# Patient Record
Sex: MALE | Race: WHITE | NOT HISPANIC OR LATINO | Employment: OTHER | URBAN - METROPOLITAN AREA
[De-identification: names, ages, dates, MRNs, and addresses within clinical notes are randomized per-mention and may not be internally consistent; named-entity substitution may affect disease eponyms.]

---

## 2019-01-17 PROBLEM — Z72.0 TOBACCO ABUSE: Chronic | Status: ACTIVE | Noted: 2019-01-17

## 2019-01-17 PROBLEM — E78.00 HYPERCHOLESTEROLEMIA: Chronic | Status: ACTIVE | Noted: 2019-01-17

## 2019-01-17 PROBLEM — I10 ESSENTIAL HYPERTENSION: Chronic | Status: ACTIVE | Noted: 2019-01-17

## 2019-02-21 PROBLEM — D75.1 POLYCYTHEMIA: Status: ACTIVE | Noted: 2019-02-21

## 2019-02-21 NOTE — PROGRESS NOTES
[facility] Hematolgy/Oncology  History & Physical    Subjective:      Patient ID:   NAME: Grisel Mckeon : 1955     63 y.o. male    Referring Doc: Bessy/Grisel Hopper  Other Physicians: Fab        Chief Complaint: polycythemia      HPI:  63 y.o. male with diagnosis of polycythemia who has been referred by Dr Hopper for evaluation by medical hematology. 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 He is here by himself. He is retired [employer] worker. He is a chronic active smoker. He does not take testosterone. He denies any CP, SOB, HA or N/V.               ROS:   GEN: normal without any fever, night sweats or weight loss  HEENT: normal with no HA's, sore throat, stiff neck, changes in vision  CV: normal with no CP, SOB, PND, HIGHTOWER or orthopnea  PULM: normal with no SOB, cough, hemoptysis, sputum or pleuritic pain  GI: normal with no abdominal pain, nausea, vomiting, constipation, diarrhea, melanotic stools, BRBPR, or hematemesis  : normal with no hematuria, dysuria  BREAST: normal with no mass, discharge, pain  SKIN: normal with no rash, erythema, bruising, or swelling       Past Medical/Surgical History:  Past Medical History:   Diagnosis Date    Chronic kidney disease     frequent kidney stones    Hypertension     Polycythemia 2019     Past Surgical History:   Procedure Laterality Date    eslw/ right, left       LITHOTRIPSY Bilateral     one surgery on each side         Allergies:  Review of patient's allergies indicates:  No Known Allergies    Social/Family History:  Social History     Socioeconomic History    Marital status: Single     Spouse name: Not on file    Number of children: Not on file    Years of education: Not on file    Highest education level: Not on file   Social Needs    Financial resource strain: Not on file    Food insecurity - worry: Not on file    Food insecurity - inability: Not on file    Transportation needs - medical: Not on file  "   Transportation needs - non-medical: Not on file   Occupational History    Occupation: retired [employer]   Tobacco Use    Smoking status: Current Some Day Smoker     Packs/day: 1.00     Types: Cigarettes    Smokeless tobacco: Never Used   Substance and Sexual Activity    Alcohol use: Yes     Alcohol/week: 0.6 oz     Types: 1 Cans of beer per week     Comment: Occasionally    Drug use: No    Sexual activity: No     Partners: Female   Other Topics Concern    Not on file   Social History Narrative    Not on file     Family History   Problem Relation Age of Onset    Heart disease Father     Asthma Mother     Cancer Brother         lung ca         Medications:    Current Outpatient Medications:     amLODIPine (NORVASC) 10 MG tablet, TK 1 T PO QD, Disp: 90 tablet, Rfl: 0    lisinopril (PRINIVIL,ZESTRIL) 5 MG tablet, Take 1 tablet (5 mg total) by mouth once daily., Disp: 90 tablet, Rfl: 0    metoprolol succinate (TOPROL XL) 25 MG 24 hr tablet, Take 1 tablet (25 mg total) by mouth once daily., Disp: 90 tablet, Rfl: 0    simvastatin (ZOCOR) 10 MG tablet, Take 1 tablet (10 mg total) by mouth every evening., Disp: 90 tablet, Rfl: 0    cloNIDine (CATAPRES) 0.1 MG tablet, Take 1 tablet (0.1 mg total) by mouth every 6 (six) hours as needed (Blood pressure greater than 150/100.)., Disp: 30 tablet, Rfl: 0    clotrimazole (LOTRIMIN) 1 % cream, APLLY ONE APPLICATION TO THE SKIN TWICE DAILY, Disp: , Rfl: 0      Pathology:  Cancer Staging  No matching staging information was found for the patient.      Objective:   Vitals:  Blood pressure (!) 164/77, pulse 74, temperature 98.6 °F (37 °C), temperature source Oral, resp. rate 20, height 5' 6" (1.676 m), weight 63.4 kg (139 lb 11.2 oz).    Physical Examination:   GEN: no apparent distress, comfortable; AAOx3  HEAD: atraumatic and normocephalic  EYES: no pallor, no icterus, PERRLA  ENT: OMM, no pharyngeal erythema, external ears WNL; no nasal discharge; no thrush  NECK: no " masses, thyroid normal, trachea midline, no LAD/LN's, supple  CV: RRR with no murmur; normal pulse; normal S1 and S2; no pedal edema  CHEST: Normal respiratory effort; CTAB; normal breath sounds; no wheeze or crackles  ABDOM: nontender and nondistended; soft; normal bowel sounds; no rebound/guarding  MUSC/Skeletal: ROM normal; no crepitus; joints normal; no deformities or arthropathy  EXTREM: no clubbing, cyanosis, inflammation or swelling  SKIN: no rashes, lesions, ulcers, petechiae or subcutaneous nodules  : no carroll  NEURO: grossly intact; motor/sensory WNL; AAOx3; no tremors  PSYCH: normal mood, affect and behavior  LYMPH: normal cervical, supraclavicular, axillary and groin LN's      Labs:   Lab Results   Component Value Date    WBC 10.2 01/09/2019    HGB 18.2 (H) 01/09/2019    HCT 52.2 (H) 01/09/2019    MCV 92.4 01/09/2019     01/09/2019    CMP  Sodium   Date Value Ref Range Status   01/09/2019 140 135 - 146 mmol/L Final     Potassium   Date Value Ref Range Status   01/09/2019 4.4 3.5 - 5.3 mmol/L Final     Chloride   Date Value Ref Range Status   01/09/2019 103 98 - 110 mmol/L Final     CO2   Date Value Ref Range Status   01/09/2019 28 20 - 32 mmol/L Final     Glucose   Date Value Ref Range Status   01/09/2019 100 (H) 65 - 99 mg/dL Final     Comment:                   Fasting reference interval     For someone without known diabetes, a glucose value  between 100 and 125 mg/dL is consistent with  prediabetes and should be confirmed with a  follow-up test.          BUN, Bld   Date Value Ref Range Status   01/09/2019 25 7 - 25 mg/dL Final     Creatinine   Date Value Ref Range Status   01/09/2019 0.74 0.70 - 1.25 mg/dL Final     Comment:     For patients >49 years of age, the reference limit  for Creatinine is approximately 13% higher for people  identified as -American.          Calcium   Date Value Ref Range Status   01/09/2019 10.0 8.6 - 10.3 mg/dL Final     Total Protein   Date Value Ref Range  Status   01/09/2019 7.2 6.1 - 8.1 g/dL Final     Albumin   Date Value Ref Range Status   01/09/2019 4.7 3.6 - 5.1 g/dL Final     Total Bilirubin   Date Value Ref Range Status   01/09/2019 1.4 (H) 0.2 - 1.2 mg/dL Final     Alkaline Phosphatase   Date Value Ref Range Status   01/09/2019 61 40 - 115 U/L Final     AST   Date Value Ref Range Status   01/09/2019 18 10 - 35 U/L Final     ALT   Date Value Ref Range Status   01/09/2019 25 9 - 46 U/L Final     Anion Gap   Date Value Ref Range Status   04/29/2012 13 8 - 16 mmol/L Final     eGFR if    Date Value Ref Range Status   01/09/2019 115 > OR = 60 mL/min/1.73m2 Final     eGFR if non    Date Value Ref Range Status   01/09/2019 99 > OR = 60 mL/min/1.73m2 Final         Radiology/Diagnostic Studies:          All lab results and imaging results have been reviewed and discussed with the patient    Assessment:   (1) 63 y.o. male  with diagnosis of polycythemia who has been referred by Dr Hopper for evaluation by medical hematology. 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   - hgb currently 18.2  - check ABG, LDH, retic, JAK2, CXR and US of liver and spleen to rule out a primary polycythemia process      (2) HTN and hypercholesterolemia    (3) Tobacco use    (4) CRI    (5) hx/of kidney stones s/p lithotripsy - followed by Dr Sanon with  in past    VISIT DIAGNOSES:              Tobacco abuse    Polycythemia            Plan:     PLAN:  1. Will order the above labs and studies  2. encouraged tobacco cessation  3. F/u with PCP  4. Consideration for pulmonary referral after the above results  RTC in 4  weeks   Fax note to Anant Sanon Jr., MD; Ruchi        I have explained and the patient understands all of  the current recommendation(s). I have answered all of their questions to the best of my ability and to their complete satisfaction.             Thank you for allowing me to participate in this  patient's care. Please call with any questions or concerns.    Electronically signed Zaheer Landers MD

## 2019-02-22 ENCOUNTER — OFFICE VISIT (OUTPATIENT)
Dept: HEMATOLOGY/ONCOLOGY | Facility: CLINIC | Age: 64
End: 2019-02-22
Payer: COMMERCIAL

## 2019-02-22 VITALS
SYSTOLIC BLOOD PRESSURE: 164 MMHG | BODY MASS INDEX: 22.45 KG/M2 | RESPIRATION RATE: 20 BRPM | HEART RATE: 74 BPM | WEIGHT: 139.69 LBS | DIASTOLIC BLOOD PRESSURE: 77 MMHG | HEIGHT: 66 IN | TEMPERATURE: 99 F

## 2019-02-22 DIAGNOSIS — D75.1 POLYCYTHEMIA: ICD-10-CM

## 2019-02-22 DIAGNOSIS — Z72.0 TOBACCO ABUSE: Primary | Chronic | ICD-10-CM

## 2019-02-22 PROCEDURE — 3078F PR MOST RECENT DIASTOLIC BLOOD PRESSURE < 80 MM HG: ICD-10-PCS | Mod: ,,, | Performed by: INTERNAL MEDICINE

## 2019-02-22 PROCEDURE — 3077F PR MOST RECENT SYSTOLIC BLOOD PRESSURE >= 140 MM HG: ICD-10-PCS | Mod: ,,, | Performed by: INTERNAL MEDICINE

## 2019-02-22 PROCEDURE — 99203 PR OFFICE/OUTPT VISIT, NEW, LEVL III, 30-44 MIN: ICD-10-PCS | Mod: ,,, | Performed by: INTERNAL MEDICINE

## 2019-02-22 PROCEDURE — 99203 OFFICE O/P NEW LOW 30 MIN: CPT | Mod: ,,, | Performed by: INTERNAL MEDICINE

## 2019-02-22 PROCEDURE — 3008F BODY MASS INDEX DOCD: CPT | Mod: ,,, | Performed by: INTERNAL MEDICINE

## 2019-02-22 PROCEDURE — 3078F DIAST BP <80 MM HG: CPT | Mod: ,,, | Performed by: INTERNAL MEDICINE

## 2019-02-22 PROCEDURE — 3077F SYST BP >= 140 MM HG: CPT | Mod: ,,, | Performed by: INTERNAL MEDICINE

## 2019-02-22 PROCEDURE — 3008F PR BODY MASS INDEX (BMI) DOCUMENTED: ICD-10-PCS | Mod: ,,, | Performed by: INTERNAL MEDICINE

## 2019-02-22 NOTE — PATIENT INSTRUCTIONS
How to Quit Smoking  Smoking is one of the hardest habits to break. About half of all people who have ever smoked have been able to quit. Most people who still smoke want to quit. Here are some of the best ways to stop smoking.    Keep trying  Most smokers make many attempts at quitting before they are successful. Its important not to give up.  Go cold turkey  Most former smokers quit cold turkey (all at once). Trying to cut back gradually doesn't seem to work as well, perhaps because it continues the smoking habit. Also, it is possible to inhale more while smoking fewer cigarettes. This results in the same amount of nicotine in your body.  Get support  Support programs can be a big help, especially for heavy smokers. These groups offer lectures, ways to change behavior, and peer support. Here are some ways to find a support program:  · Free national quitline: 800-QUIT-NOW (460-133-5943).  · Hospital quit-smoking programs.  · American Lung Association: (349.990.1263).  · American Cancer Society (368-955-4750).  Support at home is important too. Nonsmokers can offer praise and encouragement. If the smoker in your life finds it hard to quit, encourage them to keep trying.  Over-the-counter medicines  Nicotine replacement therapy may make quitting easier. Certain aids, such as the nicotine patch, gum, and lozenges, are available without a prescription. It is best to use these under a doctors care, though. The skin patch provides a steady supply of nicotine. Nicotine gum and lozenges give temporary bursts of low levels of nicotine. Both methods reduce the craving for cigarettes. Warning: If you have nausea, vomiting, dizziness, weakness, or a fast heartbeat, stop using these products and see your doctor.  Prescription medicines  After reviewing your smoking patterns and past attempts to quit, your doctor may offer a prescription medicine such as bupropion, varenicline, a nicotine inhaler, or nasal spray. Each has  "advantages and side effects. Your doctor can review these with you.  Health benefits of quitting  The benefits of quitting start right away and keep improving the longer you go without smoking. These benefits occur at any age.  So whether you are 17 or 70, quitting is a good decision. Some of the benefits include:  · 20 minutes: Blood pressure and pulse return to normal.  · 8 hours: Oxygen levels return to normal.  · 2 days: Ability to smell and taste begin to improve as damaged nerves regrow.  · 2 to 3 weeks: Circulation and lung function improve.  · 1 to 9 months: Coughing, congestion, and shortness of breath decrease; tiredness decreases.  · 1 year: Risk of heart attack decreases by half.  · 5 years: Risk of lung cancer decreases by half; risk of stroke becomes the same as a nonsmokers.  For more on how to quit smoking, try these online resources:   · Smokefree.gov  · "Clearing the Air" booklet from the National Cancer [place]: smokefree.gov/sites/default/files/pdf/clearing-the-air-accessible.pdf  Date Last Reviewed: 3/1/2017  © 0111-3464 [employer]. [address] [place] [address]. All rights reserved. This information is not intended as a substitute for professional medical care. Always follow your healthcare professional's instructions.        "

## 2019-02-22 NOTE — LETTER
February 22, 2019      Anant Sanon Jr., MD  [address]  [facility] [address]           [facility] - Hematology Oncology  [address]  [address]  [facility] [address]  Phone: [phone]  Fax: [phone]          Patient: Grisel Mckeon   MR Number: [id number]   YOB: 1955   Date of Visit: 2/22/2019       Dear Dr. Anant Sanon Jr.:    Thank you for referring Grisel Mckeon to me for evaluation. Attached you will find relevant portions of my assessment and plan of care.    If you have questions, please do not hesitate to call me. I look forward to following Grisel Mckeon along with you.    Sincerely,    Zaheer Landers MD    Enclosure  CC:  No Recipients    If you would like to receive this communication electronically, please contact [email or web address] or [phone] to request more information on [employer] access.    For providers and/or their staff who would like to refer a patient to [facility], please contact us through our one-stop-shop provider referral line, [facility], at [phone].    If you feel you have received this communication in error or would no longer like to receive these types of communications, please e-mail [email or web address]

## 2019-03-15 ENCOUNTER — TELEPHONE (OUTPATIENT)
Dept: HEMATOLOGY/ONCOLOGY | Facility: CLINIC | Age: 64
End: 2019-03-15

## 2019-03-15 NOTE — TELEPHONE ENCOUNTER
Called to see if the pt had any labs done prior to f/u appt.    Primary #  VM not set up.    2nd # was good, the pt will go back to the lab and have the two test that wasn't done performed for his f/u.     Pt VU

## 2019-03-18 NOTE — PROGRESS NOTES
[facility] Hematology/Oncology  PROGRESS NOTE - 2nd Follow-up Visit      Subjective:       Patient ID:   NAME: Grisel Mckeon : 1955     63 y.o. male    Referring Doc: Ruchi  Other Physicians: Fab    Chief Complaint:  polcythemia    History of Present Illness:     Patient returns today for a 2nd regularly scheduled follow-up visit.  The patient is here today to go over the results of the recently ordered labs, tests and studies. He is here by himself. He denies any new issues. No CP, SOB, HA's or N/V.             ROS:   GEN: normal without any fever, night sweats or weight loss  HEENT: normal with no HA's, sore throat, stiff neck, changes in vision  CV: normal with no CP, SOB, PND, HIGHTOWER or orthopnea  PULM: normal with no SOB, cough, hemoptysis, sputum or pleuritic pain  GI: normal with no abdominal pain, nausea, vomiting, constipation, diarrhea, melanotic stools, BRBPR, or hematemesis  : normal with no hematuria, dysuria  BREAST: normal with no mass, discharge, pain  SKIN: normal with no rash, erythema, bruising, or swelling    Allergies:  Review of patient's allergies indicates:  No Known Allergies    Medications:    Current Outpatient Medications:     amLODIPine (NORVASC) 10 MG tablet, TK 1 T PO QD, Disp: 90 tablet, Rfl: 0    clotrimazole (LOTRIMIN) 1 % cream, APLLY ONE APPLICATION TO THE SKIN TWICE DAILY, Disp: , Rfl: 0    diclofenac (VOLTAREN) 75 MG EC tablet, Take 1 tablet (75 mg total) by mouth 2 (two) times daily., Disp: 90 tablet, Rfl: 0    lisinopril (PRINIVIL,ZESTRIL) 5 MG tablet, Take 1 tablet (5 mg total) by mouth once daily., Disp: 90 tablet, Rfl: 0    metoprolol succinate (TOPROL XL) 25 MG 24 hr tablet, Take 1 tablet (25 mg total) by mouth once daily., Disp: 90 tablet, Rfl: 0    simvastatin (ZOCOR) 10 MG tablet, Take 1 tablet (10 mg total) by mouth every evening., Disp: 90 tablet, Rfl: 0    PMHx/PSHx Updates:  See patient's last visit with me on 2019.  See H&P on  2/22/2019        Pathology:  Cancer Staging  No matching staging information was found for the patient.          Objective:     Vitals:  Blood pressure (!) 172/84, pulse 74, temperature 97.9 °F (36.6 °C), resp. rate 20, weight 62.9 kg (138 lb 11.2 oz).    Physical Examination:   GEN: no apparent distress, comfortable; AAOx3  HEAD: atraumatic and normocephalic  EYES: no pallor, no icterus, PERRLA  ENT: OMM, no pharyngeal erythema, external ears WNL; no nasal discharge; no thrush  NECK: no masses, thyroid normal, trachea midline, no LAD/LN's, supple  CV: RRR with no murmur; normal pulse; normal S1 and S2; no pedal edema  CHEST: Normal respiratory effort; CTAB; normal breath sounds; no wheeze or crackles  ABDOM: nontender and nondistended; soft; normal bowel sounds; no rebound/guarding  MUSC/Skeletal: ROM normal; no crepitus; joints normal; no deformities or arthropathy  EXTREM: no clubbing, cyanosis, inflammation or swelling  SKIN: no rashes, lesions, ulcers, petechiae or subcutaneous nodules  : no carroll  NEURO: grossly intact; motor/sensory WNL; AAOx3; no tremors  PSYCH: normal mood, affect and behavior  LYMPH: normal cervical, supraclavicular, axillary and groin LN's            Labs:     Erythropoietin 2.6 - 18.5 mIU/mL 3.5      Retic 0.4 - 2.0 % 0.7     LD 84 - 195 U/L 133     3/14/2019  Lab Results   Component Value Date    WBC 7.20 03/14/2019    HGB 16.1 03/14/2019    HCT 47.6 03/14/2019    MCV 93 03/14/2019     03/14/2019     CMP  Sodium   Date Value Ref Range Status   03/14/2019 140 136 - 145 mmol/L Final     Potassium   Date Value Ref Range Status   03/14/2019 3.9 3.5 - 5.1 mmol/L Final     Chloride   Date Value Ref Range Status   03/14/2019 104 101 - 111 mmol/L Final     CO2   Date Value Ref Range Status   03/14/2019 27 23 - 29 mmol/L Final     Glucose   Date Value Ref Range Status   03/14/2019 102 74 - 118 mg/dL Final     BUN, Bld   Date Value Ref Range Status   03/14/2019 24 (H) 8 - 23 mg/dL Final      Creatinine   Date Value Ref Range Status   03/14/2019 0.9 0.5 - 1.4 mg/dL Final     Calcium   Date Value Ref Range Status   03/14/2019 9.0 8.6 - 10.0 mg/dL Final     Total Protein   Date Value Ref Range Status   03/14/2019 7.1 6.0 - 8.4 g/dL Final     Albumin   Date Value Ref Range Status   03/14/2019 4.4 3.5 - 5.2 g/dL Final     Total Bilirubin   Date Value Ref Range Status   03/14/2019 0.8 0.3 - 1.2 mg/dL Final     Comment:     For infants and newborns, interpretation of results should be based  on gestational age, weight and in agreement with clinical  observations.  Premature Infant recommended reference ranges:  Up to 24 hours.............<8.0 mg/dL  Up to 48 hours............<12.0 mg/dL  3-5 days..................<15.0 mg/dL  6-29 days.................<15.0 mg/dL       Alkaline Phosphatase   Date Value Ref Range Status   03/14/2019 58 38 - 126 U/L Final     AST   Date Value Ref Range Status   03/14/2019 23 15 - 41 U/L Final     ALT   Date Value Ref Range Status   03/14/2019 32 17 - 63 U/L Final     Anion Gap   Date Value Ref Range Status   03/14/2019 9 8 - 16 mmol/L Final     eGFR if    Date Value Ref Range Status   03/14/2019 >60.0 >60 mL/min/1.73 m^2 Final     eGFR if non    Date Value Ref Range Status   03/14/2019 >60.0 >60 mL/min/1.73 m^2 Final     Comment:     Calculation used to obtain the estimated glomerular filtration  rate (eGFR) is the CKD-EPI equation.          JAK2 V617F Mutation SEE BELOW    Comment: Peripheral blood, JAK2 V617F mutation analysis:   Negative for JAK2 V617F.           Radiology/Diagnostic Studies:    Us Abdomen Limited    Result Date: 2/26/2019  EXAMINATION: US ABDOMEN LIMITED CLINICAL HISTORY: Tobacco use TECHNIQUE: Limited ultrasound of the right upper quadrant of the abdomen (including pancreas, liver, gallbladder, common bile duct, and right kidney) was performed. COMPARISON: None. FINDINGS: Liver: Normal in size, measuring 15.1 cm.  Homogeneous echotexture. There is a left lobe complex cyst measuring 1.3 x 1.1 x 1.4 cm.  There are 3 cysts seen in the right lobe with the largest measuring 9 mm. Gallbladder: There are multiple polyps present with the largest measuring 5 mm.  There is no evidence of gallbladder wall thickening.  The gallbladder contains sludge. Biliary system: The common duct is not dilated, measuring 3.4 mm.  No intrahepatic ductal dilatation. Right kidney: Normal in size with no hydronephrosis, measuring 11.2 x 4.8 x 6.3 cm.  There is a 5 mm cyst in the upper pole region.  There is dilatation of the lower pole calyx. Miscellaneous: The pancreas appears unremarkable.  The pancreatic duct measured 2.7 mm..     Benign-appearing liver cysts. Gallbladder contains sludge and luminal polyps. Normal biliary radicles.  Slight prominence of the pancreatic duct.    Impression       Benign-appearing liver cysts.    Gallbladder contains sludge and luminal polyps.    Normal biliary radicles.  Slight prominence of the pancreatic duct              Electronically signed by: Petrona Seaman MD Date:    02/26/2019 Time:    09:27      I have reviewed all available lab results and radiology reports.    Assessment/Plan:   (1) 63 y.o. male  with diagnosis of polycythemia who has been referred by Dr Hopper for evaluation by medical hematology. 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   - hgb currently 18.2  - repeat CBC was WNL; LDH, Erythropoetin, retic and JAK2 were all WNL  - US on chart        (2) HTN and hypercholesterolemia     (3) Tobacco use     (4) CRI     (5) hx/of kidney stones s/p lithotripsy - followed by Dr Sanon with  in past        VISIT DIAGNOSES:      Polycythemia    Tobacco abuse          PLAN:  1. Monitor basic labs monthly  2. Encouraged tobacco cessation  3. F/u with PCP about BP  4. RTC in 4 months  Fax note to Anant Hopper Jr, MD    Discussion:       I spent over 25 mins  of time with the patient. Reviewed results of the recently ordered labs, tests and studies; made directives with regards to the results. Over half of this time was spent couseling and coordinating care.    I have explained all of the above in detail and the patient understands all of the current recommendation(s). I have answered all of their questions to the best of my ability and to their complete satisfaction.   The patient is to continue with the current management plan.            Electronically signed by Zaheer Landers MD

## 2019-03-19 ENCOUNTER — OFFICE VISIT (OUTPATIENT)
Dept: HEMATOLOGY/ONCOLOGY | Facility: CLINIC | Age: 64
End: 2019-03-19
Payer: COMMERCIAL

## 2019-03-19 VITALS
TEMPERATURE: 98 F | BODY MASS INDEX: 22.39 KG/M2 | HEART RATE: 74 BPM | RESPIRATION RATE: 20 BRPM | WEIGHT: 138.69 LBS | DIASTOLIC BLOOD PRESSURE: 84 MMHG | SYSTOLIC BLOOD PRESSURE: 172 MMHG

## 2019-03-19 DIAGNOSIS — Z72.0 TOBACCO ABUSE: Chronic | ICD-10-CM

## 2019-03-19 DIAGNOSIS — D75.1 POLYCYTHEMIA: Primary | ICD-10-CM

## 2019-03-19 PROCEDURE — 99215 OFFICE O/P EST HI 40 MIN: CPT | Mod: ,,, | Performed by: INTERNAL MEDICINE

## 2019-03-19 PROCEDURE — 99215 PR OFFICE/OUTPT VISIT, EST, LEVL V, 40-54 MIN: ICD-10-PCS | Mod: ,,, | Performed by: INTERNAL MEDICINE

## 2019-03-19 NOTE — PATIENT INSTRUCTIONS
How to Quit Smoking  Smoking is one of the hardest habits to break. About half of all people who have ever smoked have been able to quit. Most people who still smoke want to quit. Here are some of the best ways to stop smoking.    Keep trying  Most smokers make many attempts at quitting before they are successful. Its important not to give up.  Go cold turkey  Most former smokers quit cold turkey (all at once). Trying to cut back gradually doesn't seem to work as well, perhaps because it continues the smoking habit. Also, it is possible to inhale more while smoking fewer cigarettes. This results in the same amount of nicotine in your body.  Get support  Support programs can be a big help, especially for heavy smokers. These groups offer lectures, ways to change behavior, and peer support. Here are some ways to find a support program:  · Free national quitline: 800-QUIT-NOW (747-575-1053).  · Hospital quit-smoking programs.  · American Lung Association: (150.406.5325).  · American Cancer Society (392-911-3313).  Support at home is important too. Nonsmokers can offer praise and encouragement. If the smoker in your life finds it hard to quit, encourage them to keep trying.  Over-the-counter medicines  Nicotine replacement therapy may make quitting easier. Certain aids, such as the nicotine patch, gum, and lozenges, are available without a prescription. It is best to use these under a doctors care, though. The skin patch provides a steady supply of nicotine. Nicotine gum and lozenges give temporary bursts of low levels of nicotine. Both methods reduce the craving for cigarettes. Warning: If you have nausea, vomiting, dizziness, weakness, or a fast heartbeat, stop using these products and see your doctor.  Prescription medicines  After reviewing your smoking patterns and past attempts to quit, your doctor may offer a prescription medicine such as bupropion, varenicline, a nicotine inhaler, or nasal spray. Each has  "advantages and side effects. Your doctor can review these with you.  Health benefits of quitting  The benefits of quitting start right away and keep improving the longer you go without smoking. These benefits occur at any age.  So whether you are 17 or 70, quitting is a good decision. Some of the benefits include:  · 20 minutes: Blood pressure and pulse return to normal.  · 8 hours: Oxygen levels return to normal.  · 2 days: Ability to smell and taste begin to improve as damaged nerves regrow.  · 2 to 3 weeks: Circulation and lung function improve.  · 1 to 9 months: Coughing, congestion, and shortness of breath decrease; tiredness decreases.  · 1 year: Risk of heart attack decreases by half.  · 5 years: Risk of lung cancer decreases by half; risk of stroke becomes the same as a nonsmokers.  For more on how to quit smoking, try these online resources:   · Smokefree.gov  · "Clearing the Air" booklet from the [employer]: smokefree.gov/sites/default/files/pdf/clearing-the-air-accessible.pdf  Date Last Reviewed: 3/1/2017  © 4358-0371 [employer]. [address] [place] [address]. All rights reserved. This information is not intended as a substitute for professional medical care. Always follow your healthcare professional's instructions.        "

## 2019-03-19 NOTE — LETTER
March 19, 2019      Anant Sanon Jr., MD  [address]  [facility] [address]           [facility] - Hematology Oncology  [address]  [address]  [facility] [address]  Phone: [phone]  Fax: [phone]          Patient: Grisel Mckeon   MR Number: [id number]   YOB: 1955   Date of Visit: 3/19/2019       Dear Dr. Anant Sanon Jr.:    Thank you for referring Grisel Mckeon to me for evaluation. Attached you will find relevant portions of my assessment and plan of care.    If you have questions, please do not hesitate to call me. I look forward to following Grisel Mckeon along with you.    Sincerely,    Zaheer Lanedrs MD    Enclosure  CC:  No Recipients    If you would like to receive this communication electronically, please contact [email or web address] or [phone] to request more information on [employer] access.    For providers and/or their staff who would like to refer a patient to [facility], please contact us through our one-stop-shop provider referral line, [facility], at [phone].    If you feel you have received this communication in error or would no longer like to receive these types of communications, please e-mail [email or web address]

## 2019-07-17 NOTE — PROGRESS NOTES
[facility] Hematology/Oncology  PROGRESS NOTE -  Follow-up Visit      Subjective:       Patient ID:   NAME: Grisel Mckeon : 1955     63 y.o. male    Referring Doc: Ruchi  Other Physicians: Fab    Chief Complaint:  polcythemia    History of Present Illness:     Patient returns today for a  regularly scheduled follow-up visit.  The patient is here today to go over the results of the recently ordered labs, tests and studies. He is here by himself. He denies any new issues. No CP, SOB, HA's or N/V. He saw Dr Hopper recently. He is on cholesterol meds and Vit D. He is still smoking.             ROS:   GEN: normal without any fever, night sweats or weight loss  HEENT: normal with no HA's, sore throat, stiff neck, changes in vision  CV: normal with no CP, SOB, PND, HIGHTOWER or orthopnea  PULM: normal with no SOB, cough, hemoptysis, sputum or pleuritic pain  GI: normal with no abdominal pain, nausea, vomiting, constipation, diarrhea, melanotic stools, BRBPR, or hematemesis  : normal with no hematuria, dysuria  BREAST: normal with no mass, discharge, pain  SKIN: normal with no rash, erythema, bruising, or swelling    Allergies:  Review of patient's allergies indicates:  No Known Allergies    Medications:    Current Outpatient Medications:     amLODIPine (NORVASC) 10 MG tablet, TAKE 1 TABLET BY MOUTH EVERY DAY, Disp: 90 tablet, Rfl: 0    clotrimazole (LOTRIMIN) 1 % cream, APLLY ONE APPLICATION TO THE SKIN TWICE DAILY, Disp: , Rfl: 0    diclofenac (VOLTAREN) 75 MG EC tablet, Take 1 tablet (75 mg total) by mouth 2 (two) times daily., Disp: 90 tablet, Rfl: 0    ergocalciferol (VITAMIN D2) 50,000 unit Cap, Take 1 capsule (50,000 Units total) by mouth every 7 days., Disp: 12 capsule, Rfl: 0    fenofibrate (TRICOR) 54 MG tablet, Take 1 tablet (54 mg total) by mouth once daily., Disp: 90 tablet, Rfl: 0    lisinopril 10 MG tablet, Take 1 tablet (10 mg total) by mouth once daily., Disp: 90 tablet, Rfl: 0    metoprolol  succinate (TOPROL-XL) 25 MG 24 hr tablet, Take 1 tablet (25 mg total) by mouth once daily., Disp: 90 tablet, Rfl: 0    PMHx/PSHx Updates:  See patient's last visit with me on 3/19/2019.  See H&P on 2/22/2019        Pathology:  Cancer Staging  No matching staging information was found for the patient.          Objective:     Vitals:  Blood pressure (!) 141/75, pulse 71, temperature 98.7 °F (37.1 °C), temperature source Oral, resp. rate 20, weight 61.8 kg (136 lb 3.2 oz).    Physical Examination:   GEN: no apparent distress, comfortable; AAOx3  HEAD: atraumatic and normocephalic  EYES: no pallor, no icterus, PERRLA  ENT: OMM, no pharyngeal erythema, external ears WNL; no nasal discharge; no thrush  NECK: no masses, thyroid normal, trachea midline, no LAD/LN's, supple  CV: RRR with no murmur; normal pulse; normal S1 and S2; no pedal edema  CHEST: Normal respiratory effort; CTAB; normal breath sounds; no wheeze or crackles  ABDOM: nontender and nondistended; soft; normal bowel sounds; no rebound/guarding  MUSC/Skeletal: ROM normal; no crepitus; joints normal; no deformities or arthropathy  EXTREM: no clubbing, cyanosis, inflammation or swelling  SKIN: no rashes, lesions, ulcers, petechiae or subcutaneous nodules  : no carroll  NEURO: grossly intact; motor/sensory WNL; AAOx3; no tremors  PSYCH: normal mood, affect and behavior  LYMPH: normal cervical, supraclavicular, axillary and groin LN's            Labs:     7/9/2019  Lab Results   Component Value Date    WBC 8.00 07/09/2019    HGB 15.8 07/09/2019    HCT 47.1 07/09/2019    MCV 93 07/09/2019     07/09/2019     CMP  Sodium   Date Value Ref Range Status   07/09/2019 140 136 - 145 mmol/L Final     Potassium   Date Value Ref Range Status   07/09/2019 3.7 3.5 - 5.1 mmol/L Final     Chloride   Date Value Ref Range Status   07/09/2019 105 101 - 111 mmol/L Final     CO2   Date Value Ref Range Status   07/09/2019 27 23 - 29 mmol/L Final     Glucose   Date Value Ref  Range Status   07/09/2019 102 74 - 118 mg/dL Final     BUN, Bld   Date Value Ref Range Status   07/09/2019 26 (H) 8 - 23 mg/dL Final     Creatinine   Date Value Ref Range Status   07/09/2019 0.9 0.5 - 1.4 mg/dL Final     Calcium   Date Value Ref Range Status   07/09/2019 9.1 8.6 - 10.0 mg/dL Final     Total Protein   Date Value Ref Range Status   07/09/2019 7.0 6.0 - 8.4 g/dL Final     Albumin   Date Value Ref Range Status   07/09/2019 4.2 3.5 - 5.2 g/dL Final     Total Bilirubin   Date Value Ref Range Status   07/09/2019 0.9 0.3 - 1.2 mg/dL Final     Comment:     For infants and newborns, interpretation of results should be based  on gestational age, weight and in agreement with clinical  observations.  Premature Infant recommended reference ranges:  Up to 24 hours.............<8.0 mg/dL  Up to 48 hours............<12.0 mg/dL  3-5 days..................<15.0 mg/dL  6-29 days.................<15.0 mg/dL       Alkaline Phosphatase   Date Value Ref Range Status   07/09/2019 59 38 - 126 U/L Final     AST   Date Value Ref Range Status   07/09/2019 17 15 - 41 U/L Final     ALT   Date Value Ref Range Status   07/09/2019 18 17 - 63 U/L Final     Anion Gap   Date Value Ref Range Status   07/09/2019 8 8 - 16 mmol/L Final     eGFR if    Date Value Ref Range Status   07/09/2019 >60.0 >60 mL/min/1.73 m^2 Final     eGFR if non    Date Value Ref Range Status   07/09/2019 >60.0 >60 mL/min/1.73 m^2 Final     Comment:     Calculation used to obtain the estimated glomerular filtration  rate (eGFR) is the CKD-EPI equation.          JAK2 V617F Mutation SEE BELOW    Comment: Peripheral blood, JAK2 V617F mutation analysis:   Negative for JAK2 V617F.           Radiology/Diagnostic Studies:    Us Abdomen Limited    Result Date: 2/26/2019  EXAMINATION: US ABDOMEN LIMITED CLINICAL HISTORY: Tobacco use TECHNIQUE: Limited ultrasound of the right upper quadrant of the abdomen (including pancreas, liver,  gallbladder, common bile duct, and right kidney) was performed. COMPARISON: None. FINDINGS: Liver: Normal in size, measuring 15.1 cm. Homogeneous echotexture. There is a left lobe complex cyst measuring 1.3 x 1.1 x 1.4 cm.  There are 3 cysts seen in the right lobe with the largest measuring 9 mm. Gallbladder: There are multiple polyps present with the largest measuring 5 mm.  There is no evidence of gallbladder wall thickening.  The gallbladder contains sludge. Biliary system: The common duct is not dilated, measuring 3.4 mm.  No intrahepatic ductal dilatation. Right kidney: Normal in size with no hydronephrosis, measuring 11.2 x 4.8 x 6.3 cm.  There is a 5 mm cyst in the upper pole region.  There is dilatation of the lower pole calyx. Miscellaneous: The pancreas appears unremarkable.  The pancreatic duct measured 2.7 mm..     Benign-appearing liver cysts. Gallbladder contains sludge and luminal polyps. Normal biliary radicles.  Slight prominence of the pancreatic duct.    Impression       Benign-appearing liver cysts.    Gallbladder contains sludge and luminal polyps.    Normal biliary radicles.  Slight prominence of the pancreatic duct              Electronically signed by: Petrona Seaman MD Date:    02/26/2019 Time:    09:27      I have reviewed all available lab results and radiology reports.    Assessment/Plan:   (1) 63 y.o. male  with diagnosis of polycythemia who has been referred by Dr Hopper for evaluation by medical hematology. 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   - hgb currently 15.8 and adequate  - repeat CBC was WNL; LDH, Erythropoetin, retic and JAK2 were all WNL  - US on chart        (2) HTN and hypercholesterolemia     (3) Tobacco use     (4) CRI     (5) hx/of kidney stones s/p lithotripsy - followed by Dr Sanon with  in past        VISIT DIAGNOSES:      Polycythemia    Tobacco abuse          PLAN:  1. Monitor basic labs every 3 months  2.  Encouraged tobacco cessation  3. F/u with PCP about BP  4. RTC in 6 months  Fax note to Anant Hopper Jr, MD    Discussion:       I spent over 25 mins of time with the patient. Reviewed results of the recently ordered labs, tests and studies; made directives with regards to the results. Over half of this time was spent couseling and coordinating care.    I have explained all of the above in detail and the patient understands all of the current recommendation(s). I have answered all of their questions to the best of my ability and to their complete satisfaction.   The patient is to continue with the current management plan.            Electronically signed by Zaheer Landers MD

## 2019-07-18 ENCOUNTER — OFFICE VISIT (OUTPATIENT)
Dept: HEMATOLOGY/ONCOLOGY | Facility: CLINIC | Age: 64
End: 2019-07-18
Payer: COMMERCIAL

## 2019-07-18 VITALS
DIASTOLIC BLOOD PRESSURE: 75 MMHG | SYSTOLIC BLOOD PRESSURE: 141 MMHG | HEART RATE: 71 BPM | TEMPERATURE: 99 F | BODY MASS INDEX: 21.98 KG/M2 | RESPIRATION RATE: 20 BRPM | WEIGHT: 136.19 LBS

## 2019-07-18 DIAGNOSIS — Z72.0 TOBACCO ABUSE: Chronic | ICD-10-CM

## 2019-07-18 DIAGNOSIS — D75.1 POLYCYTHEMIA: Primary | ICD-10-CM

## 2019-07-18 PROCEDURE — 99213 PR OFFICE/OUTPT VISIT, EST, LEVL III, 20-29 MIN: ICD-10-PCS | Mod: ,,, | Performed by: INTERNAL MEDICINE

## 2019-07-18 PROCEDURE — 3008F PR BODY MASS INDEX (BMI) DOCUMENTED: ICD-10-PCS | Mod: ,,, | Performed by: INTERNAL MEDICINE

## 2019-07-18 PROCEDURE — 99213 OFFICE O/P EST LOW 20 MIN: CPT | Mod: ,,, | Performed by: INTERNAL MEDICINE

## 2019-07-18 PROCEDURE — 3008F BODY MASS INDEX DOCD: CPT | Mod: ,,, | Performed by: INTERNAL MEDICINE

## 2019-07-18 PROCEDURE — 3078F DIAST BP <80 MM HG: CPT | Mod: ,,, | Performed by: INTERNAL MEDICINE

## 2019-07-18 PROCEDURE — 3077F SYST BP >= 140 MM HG: CPT | Mod: ,,, | Performed by: INTERNAL MEDICINE

## 2019-07-18 PROCEDURE — 3078F PR MOST RECENT DIASTOLIC BLOOD PRESSURE < 80 MM HG: ICD-10-PCS | Mod: ,,, | Performed by: INTERNAL MEDICINE

## 2019-07-18 PROCEDURE — 3077F PR MOST RECENT SYSTOLIC BLOOD PRESSURE >= 140 MM HG: ICD-10-PCS | Mod: ,,, | Performed by: INTERNAL MEDICINE

## 2019-10-11 PROBLEM — Z12.11 COLON CANCER SCREENING: Status: ACTIVE | Noted: 2019-10-11

## 2019-11-15 PROBLEM — Z12.11 COLON CANCER SCREENING: Status: RESOLVED | Noted: 2019-10-11 | Resolved: 2019-11-15

## 2019-11-15 PROBLEM — Z98.890 S/P COLONOSCOPY WITH POLYPECTOMY: Chronic | Status: ACTIVE | Noted: 2019-11-15

## 2020-01-15 NOTE — PROGRESS NOTES
[facility] Hematology/Oncology  PROGRESS NOTE -  Follow-up Visit      Subjective:       Patient ID:   NAME: Grisel Mckeon : 1955     64 y.o. male    Referring Doc: Ruchi  Other Physicians: Fab    Chief Complaint:  polcythemia    History of Present Illness:     Patient returns today for a  regularly scheduled follow-up visit.  The patient is here today to go over the results of the recently ordered labs, tests and studies. He is here by himself. He denies any new issues. No CP, SOB, HA's or N/V. He saw Dr Hopper recently. He has been on cholesterol meds and Vit D. He is still smoking but has slowed down. He saw Dr Hopper on 2020.            ROS:   GEN: normal without any fever, night sweats or weight loss  HEENT: normal with no HA's, sore throat, stiff neck, changes in vision  CV: normal with no CP, SOB, PND, HIGHTOWER or orthopnea  PULM: normal with no SOB, cough, hemoptysis, sputum or pleuritic pain  GI: normal with no abdominal pain, nausea, vomiting, constipation, diarrhea, melanotic stools, BRBPR, or hematemesis  : normal with no hematuria, dysuria  BREAST: normal with no mass, discharge, pain  SKIN: normal with no rash, erythema, bruising, or swelling    Allergies:  Review of patient's allergies indicates:  No Known Allergies    Medications:    Current Outpatient Medications:     clotrimazole-betamethasone 1-0.05% (LOTRISONE) cream, Apply to affected area 2 times daily, Disp: 15 g, Rfl: 1    ergocalciferol (ERGOCALCIFEROL) 50,000 unit Cap, TAKE 1 CAPSULE BY MOUTH ONE TIME PER WEEK, Disp: 12 capsule, Rfl: 0    fenofibrate (TRICOR) 54 MG tablet, Take 1 tablet (54 mg total) by mouth once daily., Disp: 90 tablet, Rfl: 0    hydrALAZINE (APRESOLINE) 25 MG tablet, Take 1 tablet (25 mg total) by mouth every 8 (eight) hours., Disp: 90 tablet, Rfl: 0    lisinopril 10 MG tablet, TAKE 1 TABLET BY MOUTH EVERY DAY, Disp: 90 tablet, Rfl: 0    metoprolol succinate (TOPROL-XL) 25 MG 24 hr tablet, TAKE 1 TABLET BY  MOUTH EVERY DAY, Disp: 90 tablet, Rfl: 0    PMHx/PSHx Updates:  See patient's last visit with me on 7/18/2019.  See H&P on 2/22/2019        Pathology:  Cancer Staging  No matching staging information was found for the patient.          Objective:     Vitals:  Blood pressure (!) 175/88, pulse 87, temperature 98.5 °F (36.9 °C), temperature source Oral, resp. rate 19, weight 63.9 kg (140 lb 14.4 oz).    Physical Examination:   GEN: no apparent distress, comfortable; AAOx3  HEAD: atraumatic and normocephalic  EYES: no pallor, no icterus, PERRLA  ENT: OMM, no pharyngeal erythema, external ears WNL; no nasal discharge; no thrush  NECK: no masses, thyroid normal, trachea midline, no LAD/LN's, supple  CV: RRR with no murmur; normal pulse; normal S1 and S2; no pedal edema  CHEST: Normal respiratory effort; CTAB; normal breath sounds; no wheeze or crackles  ABDOM: nontender and nondistended; soft; normal bowel sounds; no rebound/guarding  MUSC/Skeletal: ROM normal; no crepitus; joints normal; no deformities or arthropathy  EXTREM: no clubbing, cyanosis, inflammation or swelling  SKIN: no rashes, lesions, ulcers, petechiae or subcutaneous nodules  : no carroll  NEURO: grossly intact; motor/sensory WNL; AAOx3; no tremors  PSYCH: normal mood, affect and behavior  LYMPH: normal cervical, supraclavicular, axillary and groin LN's            Labs:     1/8/2020  Lab Results   Component Value Date    WBC 8.90 01/08/2020    HGB 18.3 (H) 01/08/2020    HCT 54.5 (H) 01/08/2020    MCV 92 01/08/2020     (H) 01/08/2020     CMP  Sodium   Date Value Ref Range Status   01/08/2020 140 136 - 145 mmol/L Final     Potassium   Date Value Ref Range Status   01/08/2020 3.8 3.5 - 5.1 mmol/L Final     Chloride   Date Value Ref Range Status   01/08/2020 104 101 - 111 mmol/L Final     CO2   Date Value Ref Range Status   01/08/2020 26 23 - 29 mmol/L Final     Glucose   Date Value Ref Range Status   01/08/2020 98 74 - 118 mg/dL Final     BUN, Bld    Date Value Ref Range Status   01/08/2020 24 (H) 8 - 23 mg/dL Final     Creatinine   Date Value Ref Range Status   01/08/2020 1.1 0.5 - 1.4 mg/dL Final     Calcium   Date Value Ref Range Status   01/08/2020 9.5 8.6 - 10.0 mg/dL Final     Total Protein   Date Value Ref Range Status   01/08/2020 8.0 6.0 - 8.4 g/dL Final     Albumin   Date Value Ref Range Status   01/08/2020 4.8 3.5 - 5.2 g/dL Final     Total Bilirubin   Date Value Ref Range Status   01/08/2020 0.6 0.3 - 1.2 mg/dL Final     Comment:     For infants and newborns, interpretation of results should be based  on gestational age, weight and in agreement with clinical  observations.  Premature Infant recommended reference ranges:  Up to 24 hours.............<8.0 mg/dL  Up to 48 hours............<12.0 mg/dL  3-5 days..................<15.0 mg/dL  6-29 days.................<15.0 mg/dL       Alkaline Phosphatase   Date Value Ref Range Status   01/08/2020 51 38 - 126 U/L Final     AST   Date Value Ref Range Status   01/08/2020 21 15 - 41 U/L Final     ALT   Date Value Ref Range Status   01/08/2020 25 17 - 63 U/L Final     Anion Gap   Date Value Ref Range Status   01/08/2020 10 8 - 16 mmol/L Final     eGFR if    Date Value Ref Range Status   01/08/2020 >60.0 >60 mL/min/1.73 m^2 Final     eGFR if non    Date Value Ref Range Status   01/08/2020 >60.0 >60 mL/min/1.73 m^2 Final     Comment:     Calculation used to obtain the estimated glomerular filtration  rate (eGFR) is the CKD-EPI equation.          JAK2 V617F Mutation SEE BELOW    Comment: Peripheral blood, JAK2 V617F mutation analysis:   Negative for JAK2 V617F.           Radiology/Diagnostic Studies:    Us Abdomen Limited    Result Date: 2/26/2019  EXAMINATION: US ABDOMEN LIMITED CLINICAL HISTORY: Tobacco use TECHNIQUE: Limited ultrasound of the right upper quadrant of the abdomen (including pancreas, liver, gallbladder, common bile duct, and right kidney) was performed.  COMPARISON: None. FINDINGS: Liver: Normal in size, measuring 15.1 cm. Homogeneous echotexture. There is a left lobe complex cyst measuring 1.3 x 1.1 x 1.4 cm.  There are 3 cysts seen in the right lobe with the largest measuring 9 mm. Gallbladder: There are multiple polyps present with the largest measuring 5 mm.  There is no evidence of gallbladder wall thickening.  The gallbladder contains sludge. Biliary system: The common duct is not dilated, measuring 3.4 mm.  No intrahepatic ductal dilatation. Right kidney: Normal in size with no hydronephrosis, measuring 11.2 x 4.8 x 6.3 cm.  There is a 5 mm cyst in the upper pole region.  There is dilatation of the lower pole calyx. Miscellaneous: The pancreas appears unremarkable.  The pancreatic duct measured 2.7 mm..     Benign-appearing liver cysts. Gallbladder contains sludge and luminal polyps. Normal biliary radicles.  Slight prominence of the pancreatic duct.    Impression       Benign-appearing liver cysts.    Gallbladder contains sludge and luminal polyps.    Normal biliary radicles.  Slight prominence of the pancreatic duct              Electronically signed by: Petrona Seaman MD Date:    02/26/2019 Time:    09:27      I have reviewed all available lab results and radiology reports.    Assessment/Plan:   (1) 64 y.o. male  with diagnosis of polycythemia who has been referred by Dr Hopper for evaluation by medical hematology. 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   - hgb currently 18.3 and up little, so will set up phlebotomy  - repeat CBC was WNL; LDH, Erythropoetin, retic and JAK2 were all WNL  - US on chart        (2) HTN and hypercholesterolemia     (3) Tobacco use     (4) CRI     (5) hx/of kidney stones s/p lithotripsy - followed by Dr Sanon with  in past        VISIT DIAGNOSES:      Polycythemia    Tobacco abuse          PLAN:  1. Monitor basic labs every 3 months; set up phlebotomy this week and every 2-4  months as needed  2. Encouraged tobacco cessation  3. F/u with PCP about BP  4. RTC in 6 months  Fax note to Anant Hopper Jr, MD    Discussion:       I spent over 25 mins of time with the patient. Reviewed results of the recently ordered labs, tests and studies; made directives with regards to the results. Over half of this time was spent couseling and coordinating care.    I have explained all of the above in detail and the patient understands all of the current recommendation(s). I have answered all of their questions to the best of my ability and to their complete satisfaction.   The patient is to continue with the current management plan.            Electronically signed by Zaheer Landers MD

## 2020-01-16 ENCOUNTER — OFFICE VISIT (OUTPATIENT)
Dept: HEMATOLOGY/ONCOLOGY | Facility: CLINIC | Age: 65
End: 2020-01-16
Payer: COMMERCIAL

## 2020-01-16 VITALS
HEART RATE: 87 BPM | RESPIRATION RATE: 19 BRPM | BODY MASS INDEX: 21.42 KG/M2 | TEMPERATURE: 99 F | DIASTOLIC BLOOD PRESSURE: 88 MMHG | SYSTOLIC BLOOD PRESSURE: 175 MMHG | WEIGHT: 140.88 LBS

## 2020-01-16 DIAGNOSIS — Z72.0 TOBACCO ABUSE: Chronic | ICD-10-CM

## 2020-01-16 DIAGNOSIS — D75.1 POLYCYTHEMIA: Primary | ICD-10-CM

## 2020-01-16 PROCEDURE — 3008F BODY MASS INDEX DOCD: CPT | Mod: S$GLB,,, | Performed by: INTERNAL MEDICINE

## 2020-01-16 PROCEDURE — 3079F PR MOST RECENT DIASTOLIC BLOOD PRESSURE 80-89 MM HG: ICD-10-PCS | Mod: S$GLB,,, | Performed by: INTERNAL MEDICINE

## 2020-01-16 PROCEDURE — 99213 PR OFFICE/OUTPT VISIT, EST, LEVL III, 20-29 MIN: ICD-10-PCS | Mod: S$GLB,,, | Performed by: INTERNAL MEDICINE

## 2020-01-16 PROCEDURE — 3008F PR BODY MASS INDEX (BMI) DOCUMENTED: ICD-10-PCS | Mod: S$GLB,,, | Performed by: INTERNAL MEDICINE

## 2020-01-16 PROCEDURE — 99213 OFFICE O/P EST LOW 20 MIN: CPT | Mod: S$GLB,,, | Performed by: INTERNAL MEDICINE

## 2020-01-16 PROCEDURE — 3077F PR MOST RECENT SYSTOLIC BLOOD PRESSURE >= 140 MM HG: ICD-10-PCS | Mod: S$GLB,,, | Performed by: INTERNAL MEDICINE

## 2020-01-16 PROCEDURE — 3079F DIAST BP 80-89 MM HG: CPT | Mod: S$GLB,,, | Performed by: INTERNAL MEDICINE

## 2020-01-16 PROCEDURE — 3077F SYST BP >= 140 MM HG: CPT | Mod: S$GLB,,, | Performed by: INTERNAL MEDICINE

## 2020-07-15 NOTE — PROGRESS NOTES
[facility] Hematology/Oncology  PROGRESS NOTE -  Follow-up Visit      Subjective:       Patient ID:   NAME: Grisel Mckeon : 1955     64 y.o. male    Referring Doc: Ruchi  Other Physicians: Fab    Chief Complaint:  polcythemia    History of Present Illness:     Patient returns today for a  regularly scheduled follow-up visit.  The patient is here today to go over the results of the recently ordered labs, tests and studies. He is here by himself. He denies any new issues. No CP, SOB, HA's or N/V. He saw Dr Hopper recently. He has been on cholesterol meds and Vit D. He is still smoking but has slowed down. He last saw Dr Hopper on 2020.    Discussed covid19 precautions            ROS:   GEN: normal without any fever, night sweats or weight loss  HEENT: normal with no HA's, sore throat, stiff neck, changes in vision  CV: normal with no CP, SOB, PND, HIGHTOWER or orthopnea  PULM: normal with no SOB, cough, hemoptysis, sputum or pleuritic pain  GI: normal with no abdominal pain, nausea, vomiting, constipation, diarrhea, melanotic stools, BRBPR, or hematemesis  : normal with no hematuria, dysuria  BREAST: normal with no mass, discharge, pain  SKIN: normal with no rash, erythema, bruising, or swelling    Allergies:  Review of patient's allergies indicates:  No Known Allergies    Medications:    Current Outpatient Medications:     amLODIPine (NORVASC) 10 MG tablet, TAKE 1 TABLET BY MOUTH EVERY DAY, Disp: 90 tablet, Rfl: 0    ergocalciferol (ERGOCALCIFEROL) 50,000 unit Cap, TAKE 1 CAPSULE BY MOUTH ONE TIME PER WEEK, Disp: 12 capsule, Rfl: 0    fenofibrate (TRICOR) 145 MG tablet, Take 1 tablet (145 mg total) by mouth once daily., Disp: 90 tablet, Rfl: 0    hydrALAZINE (APRESOLINE) 25 MG tablet, TAKE 1 TABLET (25 MG TOTAL) BY MOUTH EVERY 8 (EIGHT) HOURS., Disp: 270 tablet, Rfl: 0    metoprolol succinate (TOPROL-XL) 25 MG 24 hr tablet, Take 1 tablet (25 mg total) by mouth once daily., Disp: 90 tablet, Rfl: 0     NIFEdipine (ADALAT CC) 30 MG TbSR, Take 1 tablet (30 mg total) by mouth once daily., Disp: 90 tablet, Rfl: 0    pantoprazole (PROTONIX) 40 MG tablet, Take 1 tablet (40 mg total) by mouth once daily., Disp: 90 tablet, Rfl: 0    PMHx/PSHx Updates:  See patient's last visit with me on 1/16/2020.  See H&P on 2/22/2019        Pathology:  Cancer Staging  No matching staging information was found for the patient.          Objective:     Vitals:  Blood pressure (!) 160/83, pulse 92, temperature 97.2 °F (36.2 °C), resp. rate 20, weight 63.8 kg (140 lb 9.6 oz).    Physical Examination:   GEN: no apparent distress, comfortable; AAOx3  HEAD: atraumatic and normocephalic  EYES: no pallor, no icterus, PERRLA  ENT: OMM, no pharyngeal erythema, external ears WNL; no nasal discharge; no thrush  NECK: no masses, thyroid normal, trachea midline, no LAD/LN's, supple  CV: RRR with no murmur; normal pulse; normal S1 and S2; no pedal edema  CHEST: Normal respiratory effort; CTAB; normal breath sounds; no wheeze or crackles  ABDOM: nontender and nondistended; soft; normal bowel sounds; no rebound/guarding  MUSC/Skeletal: ROM normal; no crepitus; joints normal; no deformities or arthropathy  EXTREM: no clubbing, cyanosis, inflammation or swelling  SKIN: no rashes, lesions, ulcers, petechiae or subcutaneous nodules  : no carroll  NEURO: grossly intact; motor/sensory WNL; AAOx3; no tremors  PSYCH: normal mood, affect and behavior  LYMPH: normal cervical, supraclavicular, axillary and groin LN's            Labs:        Lab Results   Component Value Date    WBC 8.2 05/04/2020    HGB 16.1 05/04/2020    HCT 47.1 05/04/2020    MCV 93.1 05/04/2020     05/04/2020     CMP  Sodium   Date Value Ref Range Status   05/04/2020 142 135 - 146 mmol/L Final     Potassium   Date Value Ref Range Status   05/04/2020 4.1 3.5 - 5.3 mmol/L Final     Chloride   Date Value Ref Range Status   05/04/2020 106 98 - 110 mmol/L Final     CO2   Date Value Ref Range  Status   05/04/2020 25 20 - 32 mmol/L Final     Glucose   Date Value Ref Range Status   05/04/2020 128 (H) 65 - 99 mg/dL Final     Comment:                   Fasting reference interval     For someone without known diabetes, a glucose  value >125 mg/dL indicates that they may have  diabetes and this should be confirmed with a  follow-up test.          BUN, Bld   Date Value Ref Range Status   05/04/2020 19 7 - 25 mg/dL Final     Creatinine   Date Value Ref Range Status   05/04/2020 0.93 0.70 - 1.25 mg/dL Final     Comment:     For patients >49 years of age, the reference limit  for Creatinine is approximately 13% higher for people  identified as -American.          Calcium   Date Value Ref Range Status   05/04/2020 9.8 8.6 - 10.3 mg/dL Final     Total Protein   Date Value Ref Range Status   05/04/2020 6.6 6.1 - 8.1 g/dL Final     Albumin   Date Value Ref Range Status   05/04/2020 4.6 3.6 - 5.1 g/dL Final     Total Bilirubin   Date Value Ref Range Status   05/04/2020 1.0 0.2 - 1.2 mg/dL Final     Alkaline Phosphatase   Date Value Ref Range Status   05/04/2020 48 35 - 144 U/L Final     AST   Date Value Ref Range Status   05/04/2020 14 10 - 35 U/L Final     ALT   Date Value Ref Range Status   05/04/2020 15 9 - 46 U/L Final     Anion Gap   Date Value Ref Range Status   01/08/2020 10 8 - 16 mmol/L Final     eGFR if    Date Value Ref Range Status   05/04/2020 101 > OR = 60 mL/min/1.73m2 Final     eGFR if non    Date Value Ref Range Status   05/04/2020 87 > OR = 60 mL/min/1.73m2 Final       JAK2 V617F Mutation SEE BELOW    Comment: Peripheral blood, JAK2 V617F mutation analysis:   Negative for JAK2 V617F.           Radiology/Diagnostic Studies:    Us Abdomen Limited    Result Date: 2/26/2019  EXAMINATION: US ABDOMEN LIMITED CLINICAL HISTORY: Tobacco use TECHNIQUE: Limited ultrasound of the right upper quadrant of the abdomen (including pancreas, liver, gallbladder, common bile duct,  and right kidney) was performed. COMPARISON: None. FINDINGS: Liver: Normal in size, measuring 15.1 cm. Homogeneous echotexture. There is a left lobe complex cyst measuring 1.3 x 1.1 x 1.4 cm.  There are 3 cysts seen in the right lobe with the largest measuring 9 mm. Gallbladder: There are multiple polyps present with the largest measuring 5 mm.  There is no evidence of gallbladder wall thickening.  The gallbladder contains sludge. Biliary system: The common duct is not dilated, measuring 3.4 mm.  No intrahepatic ductal dilatation. Right kidney: Normal in size with no hydronephrosis, measuring 11.2 x 4.8 x 6.3 cm.  There is a 5 mm cyst in the upper pole region.  There is dilatation of the lower pole calyx. Miscellaneous: The pancreas appears unremarkable.  The pancreatic duct measured 2.7 mm..     Benign-appearing liver cysts. Gallbladder contains sludge and luminal polyps. Normal biliary radicles.  Slight prominence of the pancreatic duct.    Impression       Benign-appearing liver cysts.    Gallbladder contains sludge and luminal polyps.    Normal biliary radicles.  Slight prominence of the pancreatic duct              Electronically signed by: Petrona Seaman MD Date:    02/26/2019 Time:    09:27      I have reviewed all available lab results and radiology reports.    Assessment/Plan:   (1) 64 y.o. male  with diagnosis of polycythemia who has been referred by Dr Hopper for evaluation by medical hematology. 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   - latest  hgb currently 16.1 and much better  - repeat CBC was WNL; LDH, Erythropoetin, retic and JAK2 were all WNL  - US on chart        (2) HTN and hypercholesterolemia     (3) Tobacco use     (4) CRI     (5) hx/of kidney stones s/p lithotripsy - followed by Dr Sanon with  in past        VISIT DIAGNOSES:      Tobacco abuse    Polycythemia          PLAN:  1. Monitor basic labs every 3 months; set up phlebotomy this week and  every 2-4 months as needed  2. Encouraged tobacco cessation  3. F/u with PCP about BP  4. RTC in 6 months  Fax note to Anant Hopper Jr, MD    Discussion:     COVID-19 Discussion:    I had long discussion with patient and any applicable family about the COVID-19 coronavirus epidemic and the recommended precautions with regard to cancer and/or hematology patients. I have re-iterated the CDC recommendations for adequate hand washing, use of hand -like products, and coughing into elbow, etc. In addition, especially for our patients who are on chemotherapy and/or our otherwise immunocompromised patients, I have recommended avoidance of crowds, including movie theaters, restaurants, churches, etc. I have recommended avoidance of any sick or symptomatic family members and/or friends. I have also recommended avoidance of any raw and unwashed food products, and general avoidance of food items that have not been prepared by themselves. The patient has been asked to call us immediately with any symptom developments, issues, questions or other general concerns.       I spent over 25 mins of time with the patient. Reviewed results of the recently ordered labs, tests and studies; made directives with regards to the results. Over half of this time was spent couseling and coordinating care.    I have explained all of the above in detail and the patient understands all of the current recommendation(s). I have answered all of their questions to the best of my ability and to their complete satisfaction.   The patient is to continue with the current management plan.            Electronically signed by Zaheer Landers MD

## 2020-07-16 ENCOUNTER — OFFICE VISIT (OUTPATIENT)
Dept: HEMATOLOGY/ONCOLOGY | Facility: CLINIC | Age: 65
End: 2020-07-16
Payer: COMMERCIAL

## 2020-07-16 VITALS
BODY MASS INDEX: 21.38 KG/M2 | DIASTOLIC BLOOD PRESSURE: 83 MMHG | SYSTOLIC BLOOD PRESSURE: 160 MMHG | RESPIRATION RATE: 20 BRPM | HEART RATE: 92 BPM | WEIGHT: 140.63 LBS | TEMPERATURE: 97 F

## 2020-07-16 DIAGNOSIS — D75.1 POLYCYTHEMIA: ICD-10-CM

## 2020-07-16 DIAGNOSIS — Z72.0 TOBACCO ABUSE: Primary | Chronic | ICD-10-CM

## 2020-07-16 PROCEDURE — 3077F PR MOST RECENT SYSTOLIC BLOOD PRESSURE >= 140 MM HG: ICD-10-PCS | Mod: S$GLB,,, | Performed by: INTERNAL MEDICINE

## 2020-07-16 PROCEDURE — 3079F DIAST BP 80-89 MM HG: CPT | Mod: S$GLB,,, | Performed by: INTERNAL MEDICINE

## 2020-07-16 PROCEDURE — 3079F PR MOST RECENT DIASTOLIC BLOOD PRESSURE 80-89 MM HG: ICD-10-PCS | Mod: S$GLB,,, | Performed by: INTERNAL MEDICINE

## 2020-07-16 PROCEDURE — 3077F SYST BP >= 140 MM HG: CPT | Mod: S$GLB,,, | Performed by: INTERNAL MEDICINE

## 2020-07-16 PROCEDURE — 3008F PR BODY MASS INDEX (BMI) DOCUMENTED: ICD-10-PCS | Mod: S$GLB,,, | Performed by: INTERNAL MEDICINE

## 2020-07-16 PROCEDURE — 3008F BODY MASS INDEX DOCD: CPT | Mod: S$GLB,,, | Performed by: INTERNAL MEDICINE

## 2020-07-16 PROCEDURE — 99213 OFFICE O/P EST LOW 20 MIN: CPT | Mod: S$GLB,,, | Performed by: INTERNAL MEDICINE

## 2020-07-16 PROCEDURE — 99213 PR OFFICE/OUTPT VISIT, EST, LEVL III, 20-29 MIN: ICD-10-PCS | Mod: S$GLB,,, | Performed by: INTERNAL MEDICINE

## 2021-01-12 ENCOUNTER — TELEPHONE (OUTPATIENT)
Dept: HEMATOLOGY/ONCOLOGY | Facility: CLINIC | Age: 66
End: 2021-01-12

## 2021-01-14 ENCOUNTER — OFFICE VISIT (OUTPATIENT)
Dept: HEMATOLOGY/ONCOLOGY | Facility: CLINIC | Age: 66
End: 2021-01-14
Payer: COMMERCIAL

## 2021-01-14 VITALS
TEMPERATURE: 98 F | WEIGHT: 144.31 LBS | RESPIRATION RATE: 19 BRPM | BODY MASS INDEX: 21.94 KG/M2 | DIASTOLIC BLOOD PRESSURE: 69 MMHG | HEART RATE: 89 BPM | SYSTOLIC BLOOD PRESSURE: 146 MMHG

## 2021-01-14 DIAGNOSIS — D75.1 POLYCYTHEMIA: Primary | ICD-10-CM

## 2021-01-14 DIAGNOSIS — Z72.0 TOBACCO ABUSE: Chronic | ICD-10-CM

## 2021-01-14 PROCEDURE — 3008F PR BODY MASS INDEX (BMI) DOCUMENTED: ICD-10-PCS | Mod: S$GLB,,, | Performed by: INTERNAL MEDICINE

## 2021-01-14 PROCEDURE — 3008F BODY MASS INDEX DOCD: CPT | Mod: S$GLB,,, | Performed by: INTERNAL MEDICINE

## 2021-01-14 PROCEDURE — 99213 PR OFFICE/OUTPT VISIT, EST, LEVL III, 20-29 MIN: ICD-10-PCS | Mod: S$GLB,,, | Performed by: INTERNAL MEDICINE

## 2021-01-14 PROCEDURE — 3077F SYST BP >= 140 MM HG: CPT | Mod: S$GLB,,, | Performed by: INTERNAL MEDICINE

## 2021-01-14 PROCEDURE — 3078F DIAST BP <80 MM HG: CPT | Mod: S$GLB,,, | Performed by: INTERNAL MEDICINE

## 2021-01-14 PROCEDURE — 3078F PR MOST RECENT DIASTOLIC BLOOD PRESSURE < 80 MM HG: ICD-10-PCS | Mod: S$GLB,,, | Performed by: INTERNAL MEDICINE

## 2021-01-14 PROCEDURE — 1126F AMNT PAIN NOTED NONE PRSNT: CPT | Mod: S$GLB,,, | Performed by: INTERNAL MEDICINE

## 2021-01-14 PROCEDURE — 3077F PR MOST RECENT SYSTOLIC BLOOD PRESSURE >= 140 MM HG: ICD-10-PCS | Mod: S$GLB,,, | Performed by: INTERNAL MEDICINE

## 2021-01-14 PROCEDURE — 99213 OFFICE O/P EST LOW 20 MIN: CPT | Mod: S$GLB,,, | Performed by: INTERNAL MEDICINE

## 2021-01-14 PROCEDURE — 1126F PR PAIN SEVERITY QUANTIFIED, NO PAIN PRESENT: ICD-10-PCS | Mod: S$GLB,,, | Performed by: INTERNAL MEDICINE

## 2021-07-15 ENCOUNTER — OFFICE VISIT (OUTPATIENT)
Dept: HEMATOLOGY/ONCOLOGY | Facility: CLINIC | Age: 66
End: 2021-07-15
Payer: COMMERCIAL

## 2021-07-15 VITALS
HEART RATE: 79 BPM | BODY MASS INDEX: 21.35 KG/M2 | WEIGHT: 140.38 LBS | TEMPERATURE: 98 F | RESPIRATION RATE: 18 BRPM | DIASTOLIC BLOOD PRESSURE: 70 MMHG | SYSTOLIC BLOOD PRESSURE: 118 MMHG

## 2021-07-15 DIAGNOSIS — Z72.0 TOBACCO ABUSE: Chronic | ICD-10-CM

## 2021-07-15 DIAGNOSIS — D75.1 POLYCYTHEMIA: Primary | ICD-10-CM

## 2021-07-15 PROCEDURE — 99213 OFFICE O/P EST LOW 20 MIN: CPT | Mod: S$GLB,,, | Performed by: INTERNAL MEDICINE

## 2021-07-15 PROCEDURE — 99213 PR OFFICE/OUTPT VISIT, EST, LEVL III, 20-29 MIN: ICD-10-PCS | Mod: S$GLB,,, | Performed by: INTERNAL MEDICINE

## 2022-01-11 ENCOUNTER — TELEPHONE (OUTPATIENT)
Dept: HEMATOLOGY/ONCOLOGY | Facility: CLINIC | Age: 67
End: 2022-01-11
Payer: COMMERCIAL

## 2022-01-11 DIAGNOSIS — D75.1 POLYCYTHEMIA: Primary | ICD-10-CM

## 2022-01-11 NOTE — TELEPHONE ENCOUNTER
----- Message from Rica Worrell sent at 1/11/2022 11:34 AM CST -----  Patient needs his lab order changed to [facility] labs. He is there waiting.

## 2022-01-11 NOTE — PROGRESS NOTES
[facility] Hematology/Oncology  PROGRESS NOTE -  Follow-up Visit      Subjective:       Patient ID:   NAME: Grisel Mckeon : 1955     66 y.o. male    Referring Doc: Ruchi  Other Physicians: Fab    Chief Complaint:  polcythemia    History of Present Illness:     Patient returns today for a  regularly scheduled follow-up visit.  The patient is here today to go over the results of the recently ordered labs, tests and studies. He is here by himself. He denies any new issues. No CP, SOB, HA's or N/V.     He has had cold with cough since last week but it is resolving; I recommend that he get a covid test    He is still smoking.     He last saw Dr Hopper on 2021      Discussed covid19 precautions - he had his vaccinations            ROS:   GEN: normal without any fever, night sweats or weight loss  HEENT: normal with no HA's, sore throat, stiff neck, changes in vision  CV: normal with no CP, SOB, PND, HIGHTOWER or orthopnea  PULM: normal with no SOB, cough, hemoptysis, sputum or pleuritic pain  GI: normal with no abdominal pain, nausea, vomiting, constipation, diarrhea, melanotic stools, BRBPR, or hematemesis  : normal with no hematuria, dysuria  BREAST: normal with no mass, discharge, pain  SKIN: normal with no rash, erythema, bruising, or swelling    Allergies:  Review of patient's allergies indicates:  No Known Allergies    Medications:    Current Outpatient Medications:     clotrimazole-betamethasone 1-0.05% (LOTRISONE) cream, Apply to affected area 2 times daily, Disp: 15 g, Rfl: 1    ergocalciferol (ERGOCALCIFEROL) 50,000 unit Cap, TAKE 1 PILL EVERY WEEK, Disp: 12 capsule, Rfl: 0    fenofibrate (TRICOR) 145 MG tablet, Take 1 tablet (145 mg total) by mouth once daily., Disp: 90 tablet, Rfl: 0    hydrALAZINE (APRESOLINE) 25 MG tablet, Take 1 tablet (25 mg total) by mouth every 8 (eight) hours., Disp: 270 tablet, Rfl: 0    lisinopriL (PRINIVIL,ZESTRIL) 5 MG tablet, Take 1 tablet (5 mg total) by mouth once  daily., Disp: 90 tablet, Rfl: 0    lisinopriL 10 MG tablet, Take 1 tablet (10 mg total) by mouth once daily., Disp: 90 tablet, Rfl: 0    metoprolol succinate (TOPROL-XL) 25 MG 24 hr tablet, Take 1 tablet (25 mg total) by mouth once daily., Disp: 90 tablet, Rfl: 0    NIFEdipine (ADALAT CC) 30 MG TbSR, Take 1 tablet (30 mg total) by mouth once daily., Disp: 90 tablet, Rfl: 0    PMHx/PSHx Updates:  See patient's last visit with me on 7/15/2021  See H&P on 2/22/2019        Pathology:  Cancer Staging  No matching staging information was found for the patient.          Objective:     Vitals:  Blood pressure (!) 161/84, pulse 70, temperature 97.8 °F (36.6 °C), weight 64 kg (141 lb).    Physical Examination:   GEN: no apparent distress, comfortable; AAOx3  HEAD: atraumatic and normocephalic  EYES: no pallor, no icterus, PERRLA  ENT: OMM, no pharyngeal erythema, external ears WNL; no nasal discharge; no thrush  NECK: no masses, thyroid normal, trachea midline, no LAD/LN's, supple  CV: RRR with no murmur; normal pulse; normal S1 and S2; no pedal edema  CHEST: Normal respiratory effort; CTAB; normal breath sounds; no wheeze or crackles  ABDOM: nontender and nondistended; soft; normal bowel sounds; no rebound/guarding  MUSC/Skeletal: ROM normal; no crepitus; joints normal; no deformities or arthropathy  EXTREM: no clubbing, cyanosis, inflammation or swelling  SKIN: no rashes, lesions, ulcers, petechiae or subcutaneous nodules  : no carroll  NEURO: grossly intact; motor/sensory WNL; AAOx3; no tremors  PSYCH: normal mood, affect and behavior  LYMPH: normal cervical, supraclavicular, axillary and groin LN's            Labs:        Lab Results   Component Value Date    WBC 6.05 01/11/2022    HGB 16.1 01/11/2022    HCT 49.1 01/11/2022    MCV 94 01/11/2022     01/11/2022     CMP  Sodium   Date Value Ref Range Status   01/11/2022 144 136 - 145 mmol/L Final     Potassium   Date Value Ref Range Status   01/11/2022 4.0 3.5 -  5.1 mmol/L Final     Chloride   Date Value Ref Range Status   01/11/2022 109 95 - 110 mmol/L Final     CO2   Date Value Ref Range Status   01/11/2022 24 23 - 29 mmol/L Final     Glucose   Date Value Ref Range Status   01/11/2022 96 70 - 110 mg/dL Final     BUN   Date Value Ref Range Status   01/11/2022 22 8 - 23 mg/dL Final     Creatinine   Date Value Ref Range Status   01/11/2022 1.2 0.5 - 1.4 mg/dL Final     Calcium   Date Value Ref Range Status   01/11/2022 9.7 8.7 - 10.5 mg/dL Final     Total Protein   Date Value Ref Range Status   01/11/2022 6.6 6.0 - 8.4 g/dL Final     Albumin   Date Value Ref Range Status   01/11/2022 3.9 3.5 - 5.2 g/dL Final     Total Bilirubin   Date Value Ref Range Status   01/11/2022 0.5 0.1 - 1.0 mg/dL Final     Comment:     For infants and newborns, interpretation of results should be based  on gestational age, weight and in agreement with clinical  observations.    Premature Infant recommended reference ranges:  Up to 24 hours.............<8.0 mg/dL  Up to 48 hours............<12.0 mg/dL  3-5 days..................<15.0 mg/dL  6-29 days.................<15.0 mg/dL       Alkaline Phosphatase   Date Value Ref Range Status   01/11/2022 32 (L) 55 - 135 U/L Final     AST   Date Value Ref Range Status   01/11/2022 18 10 - 40 U/L Final     ALT   Date Value Ref Range Status   01/11/2022 23 10 - 44 U/L Final     Anion Gap   Date Value Ref Range Status   01/11/2022 11 8 - 16 mmol/L Final     eGFR if    Date Value Ref Range Status   01/11/2022 >60.0 >60 mL/min/1.73 m^2 Final     eGFR if non    Date Value Ref Range Status   01/11/2022 >60.0 >60 mL/min/1.73 m^2 Final     Comment:     Calculation used to obtain the estimated glomerular filtration  rate (eGFR) is the CKD-EPI equation.          JAK2 V617F Mutation SEE BELOW    Comment: Peripheral blood, JAK2 V617F mutation analysis:   Negative for JAK2 V617F.           Radiology/Diagnostic Studies:    Us Abdomen  Limited    Result Date: 2/26/2019  EXAMINATION: US ABDOMEN LIMITED CLINICAL HISTORY: Tobacco use TECHNIQUE: Limited ultrasound of the right upper quadrant of the abdomen (including pancreas, liver, gallbladder, common bile duct, and right kidney) was performed. COMPARISON: None. FINDINGS: Liver: Normal in size, measuring 15.1 cm. Homogeneous echotexture. There is a left lobe complex cyst measuring 1.3 x 1.1 x 1.4 cm.  There are 3 cysts seen in the right lobe with the largest measuring 9 mm. Gallbladder: There are multiple polyps present with the largest measuring 5 mm.  There is no evidence of gallbladder wall thickening.  The gallbladder contains sludge. Biliary system: The common duct is not dilated, measuring 3.4 mm.  No intrahepatic ductal dilatation. Right kidney: Normal in size with no hydronephrosis, measuring 11.2 x 4.8 x 6.3 cm.  There is a 5 mm cyst in the upper pole region.  There is dilatation of the lower pole calyx. Miscellaneous: The pancreas appears unremarkable.  The pancreatic duct measured 2.7 mm..     Benign-appearing liver cysts. Gallbladder contains sludge and luminal polyps. Normal biliary radicles.  Slight prominence of the pancreatic duct.    Impression       Benign-appearing liver cysts.    Gallbladder contains sludge and luminal polyps.    Normal biliary radicles.  Slight prominence of the pancreatic duct              Electronically signed by: Petrona Seaman MD Date:    02/26/2019 Time:    09:27      I have reviewed all available lab results and radiology reports.    Assessment/Plan:   (1) 66 y.o. male  with diagnosis of polycythemia who has been referred by Dr Hopper for evaluation by medical hematology. 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   - latest  hgb currently 16.1 and much better  - repeat CBC was WNL; LDH, Erythropoetin, retic and JAK2 were all WNL  - US on chart       1/14/2021:  - last labs from Nov 2020 with Hgb at 16.2  - he has  not had any recent phlebotomies  - he is still smoking    7/15/2021:  - hgb adequate at 16.3  - he is still smoking    1/13/2022:  - latest hgb adequate at 16.1     (2) HTN and hypercholesterolemia     (3) Tobacco use     (4) CRI     (5) hx/of kidney stones s/p lithotripsy - followed by Dr Sanon with  in past        VISIT DIAGNOSES:      Polycythemia    Tobacco abuse          PLAN:  1. Monitor basic labs every 3 months; set up phlebotomy as needed every 3-6 months if needed  2. Encouraged tobacco cessation  3. F/u with PCP about BP; recommend consideration for a covid test  4. RTC in 6 months  Fax note to Anant Hopper Jr, MD    Discussion:     COVID-19 Discussion:    I had long discussion with patient and any applicable family about the COVID-19 coronavirus epidemic and the recommended precautions with regard to cancer and/or hematology patients. I have re-iterated the CDC recommendations for adequate hand washing, use of hand -like products, and coughing into elbow, etc. In addition, especially for our patients who are on chemotherapy and/or our otherwise immunocompromised patients, I have recommended avoidance of crowds, including movie theaters, restaurants, churches, etc. I have recommended avoidance of any sick or symptomatic family members and/or friends. I have also recommended avoidance of any raw and unwashed food products, and general avoidance of food items that have not been prepared by themselves. The patient has been asked to call us immediately with any symptom developments, issues, questions or other general concerns.       I spent over 25 mins of time with the patient. Reviewed results of the recently ordered labs, tests and studies; made directives with regards to the results. Over half of this time was spent couseling and coordinating care.    I have explained all of the above in detail and the patient understands all of the current recommendation(s). I have answered all of  their questions to the best of my ability and to their complete satisfaction.   The patient is to continue with the current management plan.            Electronically signed by Zaheer Landers MD

## 2022-01-13 ENCOUNTER — OFFICE VISIT (OUTPATIENT)
Dept: HEMATOLOGY/ONCOLOGY | Facility: CLINIC | Age: 67
End: 2022-01-13
Payer: COMMERCIAL

## 2022-01-13 VITALS
WEIGHT: 141 LBS | SYSTOLIC BLOOD PRESSURE: 161 MMHG | DIASTOLIC BLOOD PRESSURE: 84 MMHG | TEMPERATURE: 98 F | HEART RATE: 70 BPM | BODY MASS INDEX: 21.44 KG/M2

## 2022-01-13 DIAGNOSIS — Z72.0 TOBACCO ABUSE: Chronic | ICD-10-CM

## 2022-01-13 DIAGNOSIS — D75.1 POLYCYTHEMIA: Primary | ICD-10-CM

## 2022-01-13 PROCEDURE — 1126F AMNT PAIN NOTED NONE PRSNT: CPT | Mod: S$GLB,,, | Performed by: INTERNAL MEDICINE

## 2022-01-13 PROCEDURE — 3079F DIAST BP 80-89 MM HG: CPT | Mod: S$GLB,,, | Performed by: INTERNAL MEDICINE

## 2022-01-13 PROCEDURE — 99213 PR OFFICE/OUTPT VISIT, EST, LEVL III, 20-29 MIN: ICD-10-PCS | Mod: S$GLB,,, | Performed by: INTERNAL MEDICINE

## 2022-01-13 PROCEDURE — 3079F PR MOST RECENT DIASTOLIC BLOOD PRESSURE 80-89 MM HG: ICD-10-PCS | Mod: S$GLB,,, | Performed by: INTERNAL MEDICINE

## 2022-01-13 PROCEDURE — 3077F SYST BP >= 140 MM HG: CPT | Mod: S$GLB,,, | Performed by: INTERNAL MEDICINE

## 2022-01-13 PROCEDURE — 1160F PR REVIEW ALL MEDS BY PRESCRIBER/CLIN PHARMACIST DOCUMENTED: ICD-10-PCS | Mod: S$GLB,,, | Performed by: INTERNAL MEDICINE

## 2022-01-13 PROCEDURE — 99213 OFFICE O/P EST LOW 20 MIN: CPT | Mod: S$GLB,,, | Performed by: INTERNAL MEDICINE

## 2022-01-13 PROCEDURE — 3008F PR BODY MASS INDEX (BMI) DOCUMENTED: ICD-10-PCS | Mod: S$GLB,,, | Performed by: INTERNAL MEDICINE

## 2022-01-13 PROCEDURE — 1101F PT FALLS ASSESS-DOCD LE1/YR: CPT | Mod: S$GLB,,, | Performed by: INTERNAL MEDICINE

## 2022-01-13 PROCEDURE — 1160F RVW MEDS BY RX/DR IN RCRD: CPT | Mod: S$GLB,,, | Performed by: INTERNAL MEDICINE

## 2022-01-13 PROCEDURE — 3077F PR MOST RECENT SYSTOLIC BLOOD PRESSURE >= 140 MM HG: ICD-10-PCS | Mod: S$GLB,,, | Performed by: INTERNAL MEDICINE

## 2022-01-13 PROCEDURE — 3008F BODY MASS INDEX DOCD: CPT | Mod: S$GLB,,, | Performed by: INTERNAL MEDICINE

## 2022-01-13 PROCEDURE — 1101F PR PT FALLS ASSESS DOC 0-1 FALLS W/OUT INJ PAST YR: ICD-10-PCS | Mod: S$GLB,,, | Performed by: INTERNAL MEDICINE

## 2022-01-13 PROCEDURE — 3288F PR FALLS RISK ASSESSMENT DOCUMENTED: ICD-10-PCS | Mod: S$GLB,,, | Performed by: INTERNAL MEDICINE

## 2022-01-13 PROCEDURE — 3288F FALL RISK ASSESSMENT DOCD: CPT | Mod: S$GLB,,, | Performed by: INTERNAL MEDICINE

## 2022-01-13 PROCEDURE — 1126F PR PAIN SEVERITY QUANTIFIED, NO PAIN PRESENT: ICD-10-PCS | Mod: S$GLB,,, | Performed by: INTERNAL MEDICINE

## 2022-07-12 NOTE — PROGRESS NOTES
[facility] Hematology/Oncology  PROGRESS NOTE -  Follow-up Visit      Subjective:       Patient ID:   NAME: Grisel Mckeon : 1955     66 y.o. male    Referring Doc: Ruchi  Other Physicians: Fab    Chief Complaint:  polcythemia    History of Present Illness:     Patient returns today for a  regularly scheduled follow-up visit.  The patient is here today to go over the results of the recently ordered labs, tests and studies. He is here by himself. He denies any new issues. No CP, SOB, HA's or N/V.     He is still smoking.     He last saw Dr Hopper on 2022    Discussed covid19 precautions - he had his vaccinations            ROS:   GEN: normal without any fever, night sweats or weight loss  HEENT: normal with no HA's, sore throat, stiff neck, changes in vision  CV: normal with no CP, SOB, PND, HIGHTOWER or orthopnea  PULM: normal with no SOB, cough, hemoptysis, sputum or pleuritic pain  GI: normal with no abdominal pain, nausea, vomiting, constipation, diarrhea, melanotic stools, BRBPR, or hematemesis  : normal with no hematuria, dysuria  BREAST: normal with no mass, discharge, pain  SKIN: normal with no rash, erythema, bruising, or swelling    Allergies:  Review of patient's allergies indicates:  No Known Allergies    Medications:    Current Outpatient Medications:     ergocalciferol (VITAMIN D2) 50,000 unit Cap, Take 1 capsule (50,000 Units total) by mouth every 30 days., Disp: 12 capsule, Rfl: 0    fenofibrate (TRICOR) 145 MG tablet, Take 1 tablet (145 mg total) by mouth once daily., Disp: 90 tablet, Rfl: 0    hydrALAZINE (APRESOLINE) 25 MG tablet, Take 1 tablet (25 mg total) by mouth every 8 (eight) hours., Disp: 270 tablet, Rfl: 0    lisinopriL (PRINIVIL,ZESTRIL) 5 MG tablet, Take 1 tablet (5 mg total) by mouth once daily., Disp: 90 tablet, Rfl: 0    lisinopriL 10 MG tablet, Take 1 tablet (10 mg total) by mouth once daily., Disp: 90 tablet, Rfl: 0    metoprolol succinate (TOPROL-XL) 25 MG 24 hr tablet,  "Take 1 tablet (25 mg total) by mouth once daily., Disp: 90 tablet, Rfl: 0    NIFEdipine (ADALAT CC) 30 MG TbSR, Take 1 tablet (30 mg total) by mouth once daily., Disp: 90 tablet, Rfl: 0    PMHx/PSHx Updates:  See patient's last visit with me on 1/13/2022  See H&P on 2/22/2019        Pathology:  Cancer Staging  No matching staging information was found for the patient.          Objective:     Vitals:  Blood pressure (!) 170/83, pulse 77, temperature 97.6 °F (36.4 °C), resp. rate 17, height 5' 8" (1.727 m), weight 65.6 kg (144 lb 11.2 oz).    Physical Examination:   GEN: no apparent distress, comfortable; AAOx3  HEAD: atraumatic and normocephalic  EYES: no pallor, no icterus, PERRLA  ENT: OMM, no pharyngeal erythema, external ears WNL; no nasal discharge; no thrush  NECK: no masses, thyroid normal, trachea midline, no LAD/LN's, supple  CV: RRR with no murmur; normal pulse; normal S1 and S2; no pedal edema  CHEST: Normal respiratory effort; CTAB; normal breath sounds; no wheeze or crackles  ABDOM: nontender and nondistended; soft; normal bowel sounds; no rebound/guarding  MUSC/Skeletal: ROM normal; no crepitus; joints normal; no deformities or arthropathy  EXTREM: no clubbing, cyanosis, inflammation or swelling  SKIN: no rashes, lesions, ulcers, petechiae or subcutaneous nodules  : no carroll  NEURO: grossly intact; motor/sensory WNL; AAOx3; no tremors  PSYCH: normal mood, affect and behavior  LYMPH: normal cervical, supraclavicular, axillary and groin LN's            Labs:        Lab Results   Component Value Date    WBC 8.31 07/11/2022    HGB 16.7 07/11/2022    HCT 50.3 07/11/2022    MCV 93 07/11/2022     07/11/2022     CMP  Sodium   Date Value Ref Range Status   07/11/2022 140 136 - 145 mmol/L Final     Potassium   Date Value Ref Range Status   07/11/2022 4.0 3.5 - 5.1 mmol/L Final     Chloride   Date Value Ref Range Status   07/11/2022 109 95 - 110 mmol/L Final     CO2   Date Value Ref Range Status "   07/11/2022 20 (L) 23 - 29 mmol/L Final     Glucose   Date Value Ref Range Status   07/11/2022 96 70 - 110 mg/dL Final     BUN   Date Value Ref Range Status   07/11/2022 21 8 - 23 mg/dL Final     Creatinine   Date Value Ref Range Status   07/11/2022 1.2 0.5 - 1.4 mg/dL Final     Calcium   Date Value Ref Range Status   07/11/2022 9.4 8.7 - 10.5 mg/dL Final     Total Protein   Date Value Ref Range Status   07/11/2022 7.1 6.0 - 8.4 g/dL Final     Albumin   Date Value Ref Range Status   07/11/2022 4.1 3.5 - 5.2 g/dL Final     Total Bilirubin   Date Value Ref Range Status   07/11/2022 0.6 0.1 - 1.0 mg/dL Final     Comment:     For infants and newborns, interpretation of results should be based  on gestational age, weight and in agreement with clinical  observations.    Premature Infant recommended reference ranges:  Up to 24 hours.............<8.0 mg/dL  Up to 48 hours............<12.0 mg/dL  3-5 days..................<15.0 mg/dL  6-29 days.................<15.0 mg/dL       Alkaline Phosphatase   Date Value Ref Range Status   07/11/2022 39 (L) 55 - 135 U/L Final     AST   Date Value Ref Range Status   07/11/2022 18 10 - 40 U/L Final     ALT   Date Value Ref Range Status   07/11/2022 25 10 - 44 U/L Final     Anion Gap   Date Value Ref Range Status   07/11/2022 11 8 - 16 mmol/L Final     eGFR if    Date Value Ref Range Status   07/11/2022 >60.0 >60 mL/min/1.73 m^2 Final     eGFR if non    Date Value Ref Range Status   07/11/2022 >60.0 >60 mL/min/1.73 m^2 Final     Comment:     Calculation used to obtain the estimated glomerular filtration  rate (eGFR) is the CKD-EPI equation.          JAK2 V617F Mutation SEE BELOW    Comment: Peripheral blood, JAK2 V617F mutation analysis:   Negative for JAK2 V617F.           Radiology/Diagnostic Studies:    Us Abdomen Limited    Result Date: 2/26/2019  EXAMINATION: US ABDOMEN LIMITED CLINICAL HISTORY: Tobacco use TECHNIQUE: Limited ultrasound of the right  upper quadrant of the abdomen (including pancreas, liver, gallbladder, common bile duct, and right kidney) was performed. COMPARISON: None. FINDINGS: Liver: Normal in size, measuring 15.1 cm. Homogeneous echotexture. There is a left lobe complex cyst measuring 1.3 x 1.1 x 1.4 cm.  There are 3 cysts seen in the right lobe with the largest measuring 9 mm. Gallbladder: There are multiple polyps present with the largest measuring 5 mm.  There is no evidence of gallbladder wall thickening.  The gallbladder contains sludge. Biliary system: The common duct is not dilated, measuring 3.4 mm.  No intrahepatic ductal dilatation. Right kidney: Normal in size with no hydronephrosis, measuring 11.2 x 4.8 x 6.3 cm.  There is a 5 mm cyst in the upper pole region.  There is dilatation of the lower pole calyx. Miscellaneous: The pancreas appears unremarkable.  The pancreatic duct measured 2.7 mm..     Benign-appearing liver cysts. Gallbladder contains sludge and luminal polyps. Normal biliary radicles.  Slight prominence of the pancreatic duct.    Impression       Benign-appearing liver cysts.    Gallbladder contains sludge and luminal polyps.    Normal biliary radicles.  Slight prominence of the pancreatic duct              Electronically signed by: Petrona Seaman MD Date:    02/26/2019 Time:    09:27      I have reviewed all available lab results and radiology reports.    Assessment/Plan:   (1) 66 y.o. male  with diagnosis of polycythemia who has been referred by Dr Hopper for evaluation by medical hematology. 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   - latest  hgb currently 16.1 and much better  - repeat CBC was WNL; LDH, Erythropoetin, retic and JAK2 were all WNL  - US on chart       1/14/2021:  - last labs from Nov 2020 with Hgb at 16.2  - he has not had any recent phlebotomies  - he is still smoking    7/15/2021:  - hgb adequate at 16.3  - he is still smoking    1/13/2022:  - latest  hgb adequate at 16.1    7/13/2022:  - hgb at 16.7 and adequate and stable     (2) HTN and hypercholesterolemia     (3) Tobacco use     (4) CRI     (5) hx/of kidney stones s/p lithotripsy - followed by Dr Sanon with  in past        VISIT DIAGNOSES:      Polycythemia    Tobacco abuse          PLAN:  1. Monitor basic labs every 3 months; set up phlebotomy as needed every 3-6 months if needed  2. Encouraged tobacco cessation  3. F/u with PCP about BP   4. RTC in 6 months  Fax note to Santos Hopper MD    Discussion:     COVID-19 Discussion:    I had long discussion with patient and any applicable family about the COVID-19 coronavirus epidemic and the recommended precautions with regard to cancer and/or hematology patients. I have re-iterated the CDC recommendations for adequate hand washing, use of hand -like products, and coughing into elbow, etc. In addition, especially for our patients who are on chemotherapy and/or our otherwise immunocompromised patients, I have recommended avoidance of crowds, including movie theaters, restaurants, churches, etc. I have recommended avoidance of any sick or symptomatic family members and/or friends. I have also recommended avoidance of any raw and unwashed food products, and general avoidance of food items that have not been prepared by themselves. The patient has been asked to call us immediately with any symptom developments, issues, questions or other general concerns.       I spent over 25 mins of time with the patient. Reviewed results of the recently ordered labs, tests and studies; made directives with regards to the results. Over half of this time was spent couseling and coordinating care.    I have explained all of the above in detail and the patient understands all of the current recommendation(s). I have answered all of their questions to the best of my ability and to their complete satisfaction.   The patient is to continue with the current  management plan.            Electronically signed by Zaheer Landers MD

## 2022-07-13 ENCOUNTER — OFFICE VISIT (OUTPATIENT)
Dept: HEMATOLOGY/ONCOLOGY | Facility: CLINIC | Age: 67
End: 2022-07-13
Payer: COMMERCIAL

## 2022-07-13 VITALS
DIASTOLIC BLOOD PRESSURE: 83 MMHG | BODY MASS INDEX: 21.93 KG/M2 | HEIGHT: 68 IN | HEART RATE: 77 BPM | TEMPERATURE: 98 F | SYSTOLIC BLOOD PRESSURE: 170 MMHG | RESPIRATION RATE: 17 BRPM | WEIGHT: 144.69 LBS

## 2022-07-13 DIAGNOSIS — Z72.0 TOBACCO ABUSE: Chronic | ICD-10-CM

## 2022-07-13 DIAGNOSIS — D75.1 POLYCYTHEMIA: Primary | ICD-10-CM

## 2022-07-13 PROCEDURE — 1159F MED LIST DOCD IN RCRD: CPT | Mod: CPTII,S$GLB,, | Performed by: INTERNAL MEDICINE

## 2022-07-13 PROCEDURE — 3079F DIAST BP 80-89 MM HG: CPT | Mod: CPTII,S$GLB,, | Performed by: INTERNAL MEDICINE

## 2022-07-13 PROCEDURE — 3079F PR MOST RECENT DIASTOLIC BLOOD PRESSURE 80-89 MM HG: ICD-10-PCS | Mod: CPTII,S$GLB,, | Performed by: INTERNAL MEDICINE

## 2022-07-13 PROCEDURE — 4010F PR ACE/ARB THEARPY RXD/TAKEN: ICD-10-PCS | Mod: CPTII,S$GLB,, | Performed by: INTERNAL MEDICINE

## 2022-07-13 PROCEDURE — 3008F BODY MASS INDEX DOCD: CPT | Mod: CPTII,S$GLB,, | Performed by: INTERNAL MEDICINE

## 2022-07-13 PROCEDURE — 3077F SYST BP >= 140 MM HG: CPT | Mod: CPTII,S$GLB,, | Performed by: INTERNAL MEDICINE

## 2022-07-13 PROCEDURE — 3288F FALL RISK ASSESSMENT DOCD: CPT | Mod: CPTII,S$GLB,, | Performed by: INTERNAL MEDICINE

## 2022-07-13 PROCEDURE — 3077F PR MOST RECENT SYSTOLIC BLOOD PRESSURE >= 140 MM HG: ICD-10-PCS | Mod: CPTII,S$GLB,, | Performed by: INTERNAL MEDICINE

## 2022-07-13 PROCEDURE — 4010F ACE/ARB THERAPY RXD/TAKEN: CPT | Mod: CPTII,S$GLB,, | Performed by: INTERNAL MEDICINE

## 2022-07-13 PROCEDURE — 1126F AMNT PAIN NOTED NONE PRSNT: CPT | Mod: CPTII,S$GLB,, | Performed by: INTERNAL MEDICINE

## 2022-07-13 PROCEDURE — 1101F PR PT FALLS ASSESS DOC 0-1 FALLS W/OUT INJ PAST YR: ICD-10-PCS | Mod: CPTII,S$GLB,, | Performed by: INTERNAL MEDICINE

## 2022-07-13 PROCEDURE — 1101F PT FALLS ASSESS-DOCD LE1/YR: CPT | Mod: CPTII,S$GLB,, | Performed by: INTERNAL MEDICINE

## 2022-07-13 PROCEDURE — 3008F PR BODY MASS INDEX (BMI) DOCUMENTED: ICD-10-PCS | Mod: CPTII,S$GLB,, | Performed by: INTERNAL MEDICINE

## 2022-07-13 PROCEDURE — 99213 PR OFFICE/OUTPT VISIT, EST, LEVL III, 20-29 MIN: ICD-10-PCS | Mod: S$GLB,,, | Performed by: INTERNAL MEDICINE

## 2022-07-13 PROCEDURE — 1126F PR PAIN SEVERITY QUANTIFIED, NO PAIN PRESENT: ICD-10-PCS | Mod: CPTII,S$GLB,, | Performed by: INTERNAL MEDICINE

## 2022-07-13 PROCEDURE — 1160F PR REVIEW ALL MEDS BY PRESCRIBER/CLIN PHARMACIST DOCUMENTED: ICD-10-PCS | Mod: CPTII,S$GLB,, | Performed by: INTERNAL MEDICINE

## 2022-07-13 PROCEDURE — 1160F RVW MEDS BY RX/DR IN RCRD: CPT | Mod: CPTII,S$GLB,, | Performed by: INTERNAL MEDICINE

## 2022-07-13 PROCEDURE — 1159F PR MEDICATION LIST DOCUMENTED IN MEDICAL RECORD: ICD-10-PCS | Mod: CPTII,S$GLB,, | Performed by: INTERNAL MEDICINE

## 2022-07-13 PROCEDURE — 99213 OFFICE O/P EST LOW 20 MIN: CPT | Mod: S$GLB,,, | Performed by: INTERNAL MEDICINE

## 2022-07-13 PROCEDURE — 3288F PR FALLS RISK ASSESSMENT DOCUMENTED: ICD-10-PCS | Mod: CPTII,S$GLB,, | Performed by: INTERNAL MEDICINE

## 2022-11-14 PROBLEM — F17.200 SMOKING ADDICTION: Status: ACTIVE | Noted: 2019-01-17

## 2023-01-09 NOTE — PROGRESS NOTES
[facility] Hematology/Oncology  PROGRESS NOTE -  Follow-up Visit      Subjective:       Patient ID:   NAME: Grisel Mckeon : 1955     67 y.o. male    Referring Doc: Ruchi  Other Physicians: Fab    Chief Complaint:  polcythemia    History of Present Illness:     Patient returns today for a  regularly scheduled follow-up visit.  The patient is here today to go over the results of the recently ordered labs, tests and studies. He is here by himself.     He denies any new issues. No CP, SOB, HA's or N/V.     He is still smoking.     He recently saw Dr Hopper on 2022 and his BP has been running a little high and he was recently taken off one of his BP meds a while back; he sees Dr Hopper again at end of 2023    He was bitten by bug about three weeks ago and has healing bite on the RUE    Discussed covid19 precautions - he had his vaccinations            ROS:   GEN: normal without any fever, night sweats or weight loss  HEENT: normal with no HA's, sore throat, stiff neck, changes in vision  CV: normal with no CP, SOB, PND, HIGHTOWER or orthopnea  PULM: normal with no SOB, cough, hemoptysis, sputum or pleuritic pain  GI: normal with no abdominal pain, nausea, vomiting, constipation, diarrhea, melanotic stools, BRBPR, or hematemesis  : normal with no hematuria, dysuria  BREAST: normal with no mass, discharge, pain  SKIN: normal with no rash, erythema, bruising, or swelling; recent bug bite on RUE    Allergies:  Review of patient's allergies indicates:  No Known Allergies    Medications:    Current Outpatient Medications:     doxycycline (VIBRAMYCIN) 100 MG Cap, Take 1 capsule (100 mg total) by mouth every 12 (twelve) hours., Disp: 20 capsule, Rfl: 0    ergocalciferol (VITAMIN D2) 50,000 unit Cap, Take 1 capsule (50,000 Units total) by mouth every 30 days., Disp: 12 capsule, Rfl: 0    fenofibrate (TRICOR) 145 MG tablet, Take 1 tablet (145 mg total) by mouth once daily., Disp: 90 tablet, Rfl: 0    hydrALAZINE  "(APRESOLINE) 25 MG tablet, Take 1 tablet (25 mg total) by mouth every 8 (eight) hours., Disp: 270 tablet, Rfl: 0    lisinopriL 10 MG tablet, Take 1 tablet (10 mg total) by mouth once daily., Disp: 90 tablet, Rfl: 0    metoprolol succinate (TOPROL-XL) 25 MG 24 hr tablet, Take 1 tablet (25 mg total) by mouth once daily., Disp: 90 tablet, Rfl: 0    NIFEdipine (ADALAT CC) 30 MG TbSR, Take 1 tablet (30 mg total) by mouth once daily., Disp: 90 tablet, Rfl: 0    PMHx/PSHx Updates:  See patient's last visit with me on 7/13/2022  See H&P on 2/22/2019        Pathology:  Cancer Staging  No matching staging information was found for the patient.          Objective:     Vitals:  Blood pressure (!) 169/88, pulse 92, temperature 98.3 °F (36.8 °C), resp. rate 16, height 5' 8" (1.727 m), weight 64 kg (141 lb 3.2 oz).    Physical Examination:   GEN: no apparent distress, comfortable; AAOx3  HEAD: atraumatic and normocephalic  EYES: no pallor, no icterus, PERRLA  ENT: OMM, no pharyngeal erythema, external ears WNL; no nasal discharge; no thrush  NECK: no masses, thyroid normal, trachea midline, no LAD/LN's, supple  CV: RRR with no murmur; normal pulse; normal S1 and S2; no pedal edema  CHEST: Normal respiratory effort; CTAB; normal breath sounds; no wheeze or crackles  ABDOM: nontender and nondistended; soft; normal bowel sounds; no rebound/guarding  MUSC/Skeletal: ROM normal; no crepitus; joints normal; no deformities or arthropathy  EXTREM: no clubbing, cyanosis, inflammation or swelling  SKIN: no rashes, lesions, ulcers, petechiae or subcutaneous nodules; healing bug bite on RUE  : no carroll  NEURO: grossly intact; motor/sensory WNL; AAOx3; no tremors  PSYCH: normal mood, affect and behavior  LYMPH: normal cervical, supraclavicular, axillary and groin LN's            Labs:        Lab Results   Component Value Date    WBC 9.37 01/06/2023    HGB 17.3 01/06/2023    HCT 52.1 01/06/2023    MCV 96 01/06/2023     01/06/2023 "     CMP  Sodium   Date Value Ref Range Status   01/06/2023 140 136 - 145 mmol/L Final     Potassium   Date Value Ref Range Status   01/06/2023 4.0 3.5 - 5.1 mmol/L Final     Chloride   Date Value Ref Range Status   01/06/2023 108 95 - 110 mmol/L Final     CO2   Date Value Ref Range Status   01/06/2023 25 23 - 29 mmol/L Final     Glucose   Date Value Ref Range Status   01/06/2023 103 70 - 110 mg/dL Final     BUN   Date Value Ref Range Status   01/06/2023 23 8 - 23 mg/dL Final     Creatinine   Date Value Ref Range Status   01/06/2023 1.1 0.5 - 1.4 mg/dL Final     Calcium   Date Value Ref Range Status   01/06/2023 9.5 8.7 - 10.5 mg/dL Final     Total Protein   Date Value Ref Range Status   01/06/2023 6.9 6.0 - 8.4 g/dL Final     Albumin   Date Value Ref Range Status   01/06/2023 4.1 3.5 - 5.2 g/dL Final     Total Bilirubin   Date Value Ref Range Status   01/06/2023 0.5 0.1 - 1.0 mg/dL Final     Comment:     For infants and newborns, interpretation of results should be based  on gestational age, weight and in agreement with clinical  observations.    Premature Infant recommended reference ranges:  Up to 24 hours.............<8.0 mg/dL  Up to 48 hours............<12.0 mg/dL  3-5 days..................<15.0 mg/dL  6-29 days.................<15.0 mg/dL       Alkaline Phosphatase   Date Value Ref Range Status   01/06/2023 38 (L) 55 - 135 U/L Final     AST   Date Value Ref Range Status   01/06/2023 16 10 - 40 U/L Final     ALT   Date Value Ref Range Status   01/06/2023 25 10 - 44 U/L Final     Anion Gap   Date Value Ref Range Status   01/06/2023 7 (L) 8 - 16 mmol/L Final     eGFR if    Date Value Ref Range Status   07/11/2022 >60.0 >60 mL/min/1.73 m^2 Final     eGFR if non    Date Value Ref Range Status   07/11/2022 >60.0 >60 mL/min/1.73 m^2 Final     Comment:     Calculation used to obtain the estimated glomerular filtration  rate (eGFR) is the CKD-EPI equation.          JAK2 V617F Mutation  SEE BELOW    Comment: Peripheral blood, JAK2 V617F mutation analysis:   Negative for JAK2 V617F.           Radiology/Diagnostic Studies:    Us Abdomen Limited    Result Date: 2/26/2019  EXAMINATION: US ABDOMEN LIMITED CLINICAL HISTORY: Tobacco use TECHNIQUE: Limited ultrasound of the right upper quadrant of the abdomen (including pancreas, liver, gallbladder, common bile duct, and right kidney) was performed. COMPARISON: None. FINDINGS: Liver: Normal in size, measuring 15.1 cm. Homogeneous echotexture. There is a left lobe complex cyst measuring 1.3 x 1.1 x 1.4 cm.  There are 3 cysts seen in the right lobe with the largest measuring 9 mm. Gallbladder: There are multiple polyps present with the largest measuring 5 mm.  There is no evidence of gallbladder wall thickening.  The gallbladder contains sludge. Biliary system: The common duct is not dilated, measuring 3.4 mm.  No intrahepatic ductal dilatation. Right kidney: Normal in size with no hydronephrosis, measuring 11.2 x 4.8 x 6.3 cm.  There is a 5 mm cyst in the upper pole region.  There is dilatation of the lower pole calyx. Miscellaneous: The pancreas appears unremarkable.  The pancreatic duct measured 2.7 mm..     Benign-appearing liver cysts. Gallbladder contains sludge and luminal polyps. Normal biliary radicles.  Slight prominence of the pancreatic duct.    Impression       Benign-appearing liver cysts.    Gallbladder contains sludge and luminal polyps.    Normal biliary radicles.  Slight prominence of the pancreatic duct              Electronically signed by: Petrona Seaman MD Date:    02/26/2019 Time:    09:27      I have reviewed all available lab results and radiology reports.    Assessment/Plan:   (1) 67 y.o. male  with diagnosis of polycythemia who has been referred by Dr Hopper for evaluation by medical hematology. 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   - latest  hgb currently 16.1 and much better  -  repeat CBC was WNL; LDH, Erythropoetin, retic and JAK2 were all WNL  - US on chart       1/14/2021:  - last labs from Nov 2020 with Hgb at 16.2  - he has not had any recent phlebotomies  - he is still smoking    7/15/2021:  - hgb adequate at 16.3  - he is still smoking    1/13/2022:  - latest hgb adequate at 16.1    7/13/2022:  - hgb at 16.7 and adequate and stable    1/11/2013:  - latest hgb adequate at 17.3 and WNL still  - iron WNL and ferritin at 322     (2) HTN and hypercholesterolemia     (3) Tobacco use     (4) CRI     (5) hx/of kidney stones s/p lithotripsy - followed by Dr Sanon with  in past        VISIT DIAGNOSES:      Polycythemia    Smoking addiction          PLAN:  1. Monitor basic labs every 3 months; set up phlebotomy as needed every 3-6 months if needed  2. Encouraged tobacco cessation  3. F/u with PCP about BP   4. RTC in 6 months  Fax note to Santos Hopper MD    Discussion:     COVID-19 Discussion:    I had long discussion with patient and any applicable family about the COVID-19 coronavirus epidemic and the recommended precautions with regard to cancer and/or hematology patients. I have re-iterated the CDC recommendations for adequate hand washing, use of hand -like products, and coughing into elbow, etc. In addition, especially for our patients who are on chemotherapy and/or our otherwise immunocompromised patients, I have recommended avoidance of crowds, including movie theaters, restaurants, churches, etc. I have recommended avoidance of any sick or symptomatic family members and/or friends. I have also recommended avoidance of any raw and unwashed food products, and general avoidance of food items that have not been prepared by themselves. The patient has been asked to call us immediately with any symptom developments, issues, questions or other general concerns.       I spent over 25 mins of time with the patient. Reviewed results of the recently ordered labs, tests  and studies; made directives with regards to the results. Over half of this time was spent couseling and coordinating care.    I have explained all of the above in detail and the patient understands all of the current recommendation(s). I have answered all of their questions to the best of my ability and to their complete satisfaction.   The patient is to continue with the current management plan.            Electronically signed by Zaheer Landers MD

## 2023-01-11 ENCOUNTER — OFFICE VISIT (OUTPATIENT)
Dept: HEMATOLOGY/ONCOLOGY | Facility: CLINIC | Age: 68
End: 2023-01-11
Payer: COMMERCIAL

## 2023-01-11 VITALS
RESPIRATION RATE: 16 BRPM | WEIGHT: 141.19 LBS | DIASTOLIC BLOOD PRESSURE: 88 MMHG | HEART RATE: 92 BPM | BODY MASS INDEX: 21.4 KG/M2 | HEIGHT: 68 IN | TEMPERATURE: 98 F | SYSTOLIC BLOOD PRESSURE: 169 MMHG

## 2023-01-11 DIAGNOSIS — F17.200 SMOKING ADDICTION: ICD-10-CM

## 2023-01-11 DIAGNOSIS — D75.1 POLYCYTHEMIA: Primary | ICD-10-CM

## 2023-01-11 PROCEDURE — 3077F PR MOST RECENT SYSTOLIC BLOOD PRESSURE >= 140 MM HG: ICD-10-PCS | Mod: CPTII,S$GLB,, | Performed by: INTERNAL MEDICINE

## 2023-01-11 PROCEDURE — 1159F PR MEDICATION LIST DOCUMENTED IN MEDICAL RECORD: ICD-10-PCS | Mod: CPTII,S$GLB,, | Performed by: INTERNAL MEDICINE

## 2023-01-11 PROCEDURE — 4010F ACE/ARB THERAPY RXD/TAKEN: CPT | Mod: CPTII,S$GLB,, | Performed by: INTERNAL MEDICINE

## 2023-01-11 PROCEDURE — 1160F PR REVIEW ALL MEDS BY PRESCRIBER/CLIN PHARMACIST DOCUMENTED: ICD-10-PCS | Mod: CPTII,S$GLB,, | Performed by: INTERNAL MEDICINE

## 2023-01-11 PROCEDURE — 1159F MED LIST DOCD IN RCRD: CPT | Mod: CPTII,S$GLB,, | Performed by: INTERNAL MEDICINE

## 2023-01-11 PROCEDURE — 99213 OFFICE O/P EST LOW 20 MIN: CPT | Mod: S$GLB,,, | Performed by: INTERNAL MEDICINE

## 2023-01-11 PROCEDURE — 99213 PR OFFICE/OUTPT VISIT, EST, LEVL III, 20-29 MIN: ICD-10-PCS | Mod: S$GLB,,, | Performed by: INTERNAL MEDICINE

## 2023-01-11 PROCEDURE — 3079F DIAST BP 80-89 MM HG: CPT | Mod: CPTII,S$GLB,, | Performed by: INTERNAL MEDICINE

## 2023-01-11 PROCEDURE — 1126F AMNT PAIN NOTED NONE PRSNT: CPT | Mod: CPTII,S$GLB,, | Performed by: INTERNAL MEDICINE

## 2023-01-11 PROCEDURE — 3008F BODY MASS INDEX DOCD: CPT | Mod: CPTII,S$GLB,, | Performed by: INTERNAL MEDICINE

## 2023-01-11 PROCEDURE — 3288F FALL RISK ASSESSMENT DOCD: CPT | Mod: CPTII,S$GLB,, | Performed by: INTERNAL MEDICINE

## 2023-01-11 PROCEDURE — 3077F SYST BP >= 140 MM HG: CPT | Mod: CPTII,S$GLB,, | Performed by: INTERNAL MEDICINE

## 2023-01-11 PROCEDURE — 3079F PR MOST RECENT DIASTOLIC BLOOD PRESSURE 80-89 MM HG: ICD-10-PCS | Mod: CPTII,S$GLB,, | Performed by: INTERNAL MEDICINE

## 2023-01-11 PROCEDURE — 1126F PR PAIN SEVERITY QUANTIFIED, NO PAIN PRESENT: ICD-10-PCS | Mod: CPTII,S$GLB,, | Performed by: INTERNAL MEDICINE

## 2023-01-11 PROCEDURE — 1101F PR PT FALLS ASSESS DOC 0-1 FALLS W/OUT INJ PAST YR: ICD-10-PCS | Mod: CPTII,S$GLB,, | Performed by: INTERNAL MEDICINE

## 2023-01-11 PROCEDURE — 1160F RVW MEDS BY RX/DR IN RCRD: CPT | Mod: CPTII,S$GLB,, | Performed by: INTERNAL MEDICINE

## 2023-01-11 PROCEDURE — 3288F PR FALLS RISK ASSESSMENT DOCUMENTED: ICD-10-PCS | Mod: CPTII,S$GLB,, | Performed by: INTERNAL MEDICINE

## 2023-01-11 PROCEDURE — 3008F PR BODY MASS INDEX (BMI) DOCUMENTED: ICD-10-PCS | Mod: CPTII,S$GLB,, | Performed by: INTERNAL MEDICINE

## 2023-01-11 PROCEDURE — 4010F PR ACE/ARB THEARPY RXD/TAKEN: ICD-10-PCS | Mod: CPTII,S$GLB,, | Performed by: INTERNAL MEDICINE

## 2023-01-11 PROCEDURE — 1101F PT FALLS ASSESS-DOCD LE1/YR: CPT | Mod: CPTII,S$GLB,, | Performed by: INTERNAL MEDICINE

## 2023-07-11 NOTE — PROGRESS NOTES
"[facility] Hematology/Oncology  PROGRESS NOTE -  Follow-up Visit      Subjective:       Patient ID:   NAME: Grisel Mckeon : 1955     67 y.o. male    Referring Doc: Ruchi  Other Physicians: Fab    Chief Complaint:  polcythemia    History of Present Illness:     Patient returns today for a  regularly scheduled follow-up visit.  The patient is here today to go over the results of the recently ordered labs, tests and studies. He is here by himself.     He denies any new issues. No CP, SOB, HA's or N/V.     He is still smoking.     He recently saw Bessy Hopper NP on 2023 and BP has been running good and he has been feeling "fine"         Discussed covid19 precautions - he had his vaccinations            ROS:   GEN: normal without any fever, night sweats or weight loss  HEENT: normal with no HA's, sore throat, stiff neck, changes in vision  CV: normal with no CP, SOB, PND, HIGHTOWER or orthopnea  PULM: normal with no SOB, cough, hemoptysis, sputum or pleuritic pain  GI: normal with no abdominal pain, nausea, vomiting, constipation, diarrhea, melanotic stools, BRBPR, or hematemesis  : normal with no hematuria, dysuria  BREAST: normal with no mass, discharge, pain  SKIN: normal with no rash, erythema, bruising, or swelling;      Allergies:  Review of patient's allergies indicates:  No Known Allergies    Medications:    Current Outpatient Medications:     chlorthalidone (HYGROTEN) 25 MG Tab, Take 1 tablet (25 mg total) by mouth once daily., Disp: 90 tablet, Rfl: 0    ergocalciferol (VITAMIN D2) 50,000 unit Cap, Take 1 capsule (50,000 Units total) by mouth every 30 days., Disp: 12 capsule, Rfl: 0    fenofibrate (TRICOR) 145 MG tablet, Take 1 tablet (145 mg total) by mouth once daily., Disp: 90 tablet, Rfl: 0    hydrALAZINE (APRESOLINE) 25 MG tablet, Take 1 tablet (25 mg total) by mouth every 8 (eight) hours., Disp: 270 tablet, Rfl: 0    lisinopriL 10 MG tablet, Take 1 tablet (10 mg total) by mouth once daily., Disp: 90 " "tablet, Rfl: 0    metoprolol succinate (TOPROL-XL) 25 MG 24 hr tablet, Take 1 tablet (25 mg total) by mouth once daily., Disp: 90 tablet, Rfl: 0    NIFEdipine (ADALAT CC) 30 MG TbSR, Take 1 tablet (30 mg total) by mouth once daily., Disp: 90 tablet, Rfl: 0    PMHx/PSHx Updates:  See patient's last visit with me on 1/11/2023  See H&P on 2/22/2019        Pathology:  Cancer Staging  No matching staging information was found for the patient.          Objective:     Vitals:  Blood pressure 135/77, pulse 78, temperature 97.9 °F (36.6 °C), resp. rate 18, height 5' 8" (1.727 m), weight 65.2 kg (143 lb 12.8 oz).    Physical Examination:   GEN: no apparent distress, comfortable; AAOx3  HEAD: atraumatic and normocephalic  EYES: no pallor, no icterus, PERRLA  ENT: OMM, no pharyngeal erythema, external ears WNL; no nasal discharge; no thrush  NECK: no masses, thyroid normal, trachea midline, no LAD/LN's, supple  CV: RRR with no murmur; normal pulse; normal S1 and S2; no pedal edema  CHEST: Normal respiratory effort; CTAB; normal breath sounds; no wheeze or crackles  ABDOM: nontender and nondistended; soft; normal bowel sounds; no rebound/guarding  MUSC/Skeletal: ROM normal; no crepitus; joints normal; no deformities or arthropathy  EXTREM: no clubbing, cyanosis, inflammation or swelling  SKIN: no rashes, lesions, ulcers, petechiae or subcutaneous nodules;    : no carroll  NEURO: grossly intact; motor/sensory WNL; AAOx3; no tremors  PSYCH: normal mood, affect and behavior  LYMPH: normal cervical, supraclavicular, axillary and groin LN's            Labs:        Lab Results   Component Value Date    WBC 10.0 07/11/2023    HGB 17.1 07/11/2023    HCT 48.1 07/11/2023    MCV 92.3 07/11/2023     (H) 07/11/2023     CMP  Sodium   Date Value Ref Range Status   07/11/2023 138 135 - 146 mmol/L Final     Potassium   Date Value Ref Range Status   07/11/2023 4.0 3.5 - 5.3 mmol/L Final     Chloride   Date Value Ref Range Status "   07/11/2023 104 98 - 110 mmol/L Final     CO2   Date Value Ref Range Status   07/11/2023 27 20 - 32 mmol/L Final     Glucose   Date Value Ref Range Status   07/11/2023 95 65 - 99 mg/dL Final     Comment:                   Fasting reference interval          BUN   Date Value Ref Range Status   07/11/2023 33 (H) 7 - 25 mg/dL Final     Creatinine   Date Value Ref Range Status   07/11/2023 1.31 0.70 - 1.35 mg/dL Final     Calcium   Date Value Ref Range Status   07/11/2023 9.8 8.6 - 10.3 mg/dL Final     Total Protein   Date Value Ref Range Status   07/11/2023 7.2 6.1 - 8.1 g/dL Final     Albumin   Date Value Ref Range Status   07/11/2023 4.7 3.6 - 5.1 g/dL Final     Total Bilirubin   Date Value Ref Range Status   07/11/2023 0.5 0.2 - 1.2 mg/dL Final     Alkaline Phosphatase   Date Value Ref Range Status   07/07/2023 29 (L) 55 - 135 U/L Final     AST   Date Value Ref Range Status   07/11/2023 16 10 - 35 U/L Final     ALT   Date Value Ref Range Status   07/11/2023 23 9 - 46 U/L Final     Anion Gap   Date Value Ref Range Status   07/07/2023 10 8 - 16 mmol/L Final     eGFR if    Date Value Ref Range Status   07/11/2022 >60.0 >60 mL/min/1.73 m^2 Final     eGFR if non    Date Value Ref Range Status   07/11/2022 >60.0 >60 mL/min/1.73 m^2 Final     Comment:     Calculation used to obtain the estimated glomerular filtration  rate (eGFR) is the CKD-EPI equation.           Lab Results   Component Value Date    IRON 147 07/07/2023    TRANSFERRIN 375 07/07/2023    TIBC 555 (H) 07/07/2023    FESATURATED 26 07/07/2023                Radiology/Diagnostic Studies:    Us Abdomen Limited    Result Date: 2/26/2019  EXAMINATION: US ABDOMEN LIMITED CLINICAL HISTORY: Tobacco use TECHNIQUE: Limited ultrasound of the right upper quadrant of the abdomen (including pancreas, liver, gallbladder, common bile duct, and right kidney) was performed. COMPARISON: None. FINDINGS: Liver: Normal in size, measuring 15.1 cm.  Homogeneous echotexture. There is a left lobe complex cyst measuring 1.3 x 1.1 x 1.4 cm.  There are 3 cysts seen in the right lobe with the largest measuring 9 mm. Gallbladder: There are multiple polyps present with the largest measuring 5 mm.  There is no evidence of gallbladder wall thickening.  The gallbladder contains sludge. Biliary system: The common duct is not dilated, measuring 3.4 mm.  No intrahepatic ductal dilatation. Right kidney: Normal in size with no hydronephrosis, measuring 11.2 x 4.8 x 6.3 cm.  There is a 5 mm cyst in the upper pole region.  There is dilatation of the lower pole calyx. Miscellaneous: The pancreas appears unremarkable.  The pancreatic duct measured 2.7 mm..     Benign-appearing liver cysts. Gallbladder contains sludge and luminal polyps. Normal biliary radicles.  Slight prominence of the pancreatic duct.    Impression       Benign-appearing liver cysts.    Gallbladder contains sludge and luminal polyps.    Normal biliary radicles.  Slight prominence of the pancreatic duct              Electronically signed by: Petrona Seaman MD Date:    02/26/2019 Time:    09:27      I have reviewed all available lab results and radiology reports.    Assessment/Plan:   (1) 67 y.o. male  with diagnosis of polycythemia who has been referred by Dr Hopper for evaluation by medical hematology. 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   - latest  hgb currently 16.1 and much better  - repeat CBC was WNL; LDH, Erythropoetin, retic and JAK2 were all WNL  - US on chart       1/14/2021:  - last labs from Nov 2020 with Hgb at 16.2  - he has not had any recent phlebotomies  - he is still smoking    7/15/2021:  - hgb adequate at 16.3  - he is still smoking    1/13/2022:  - latest hgb adequate at 16.1    7/13/2022:  - hgb at 16.7 and adequate and stable    1/11/2013:  - latest hgb adequate at 17.3 and WNL still  - iron WNL and ferritin at 322    7/12/2023:  - latest  Hgb WNL 17.1 - 16.3     (2) HTN and hypercholesterolemia     (3) Tobacco use     (4) CRI     (5) hx/of kidney stones s/p lithotripsy - followed by Dr Sanon with  in past        VISIT DIAGNOSES:      Polycythemia    Smoking addiction          PLAN:  1. Monitor basic labs every 3 months; set up phlebotomy as needed every 3-6 months if needed  2. Encouraged tobacco cessation  3. F/u with PCP about BP   4. RTC in 6 months  Fax note to Santos Hopper MD    Discussion:     COVID-19 Discussion:    I had long discussion with patient and any applicable family about the COVID-19 coronavirus epidemic and the recommended precautions with regard to cancer and/or hematology patients. I have re-iterated the CDC recommendations for adequate hand washing, use of hand -like products, and coughing into elbow, etc. In addition, especially for our patients who are on chemotherapy and/or our otherwise immunocompromised patients, I have recommended avoidance of crowds, including movie theaters, restaurants, churches, etc. I have recommended avoidance of any sick or symptomatic family members and/or friends. I have also recommended avoidance of any raw and unwashed food products, and general avoidance of food items that have not been prepared by themselves. The patient has been asked to call us immediately with any symptom developments, issues, questions or other general concerns.       I spent over 25 mins of time with the patient. Reviewed results of the recently ordered labs, tests and studies; made directives with regards to the results. Over half of this time was spent couseling and coordinating care.    I have explained all of the above in detail and the patient understands all of the current recommendation(s). I have answered all of their questions to the best of my ability and to their complete satisfaction.   The patient is to continue with the current management plan.            Electronically signed by  Zaheer Landers MD

## 2023-07-12 ENCOUNTER — OFFICE VISIT (OUTPATIENT)
Dept: HEMATOLOGY/ONCOLOGY | Facility: CLINIC | Age: 68
End: 2023-07-12
Payer: COMMERCIAL

## 2023-07-12 VITALS
SYSTOLIC BLOOD PRESSURE: 135 MMHG | HEIGHT: 68 IN | BODY MASS INDEX: 21.79 KG/M2 | TEMPERATURE: 98 F | DIASTOLIC BLOOD PRESSURE: 77 MMHG | RESPIRATION RATE: 18 BRPM | WEIGHT: 143.81 LBS | HEART RATE: 78 BPM

## 2023-07-12 DIAGNOSIS — F17.200 SMOKING ADDICTION: ICD-10-CM

## 2023-07-12 DIAGNOSIS — D75.1 POLYCYTHEMIA: Primary | ICD-10-CM

## 2023-07-12 PROCEDURE — 1160F RVW MEDS BY RX/DR IN RCRD: CPT | Mod: CPTII,S$GLB,, | Performed by: INTERNAL MEDICINE

## 2023-07-12 PROCEDURE — 3075F PR MOST RECENT SYSTOLIC BLOOD PRESS GE 130-139MM HG: ICD-10-PCS | Mod: CPTII,S$GLB,, | Performed by: INTERNAL MEDICINE

## 2023-07-12 PROCEDURE — 99213 PR OFFICE/OUTPT VISIT, EST, LEVL III, 20-29 MIN: ICD-10-PCS | Mod: S$GLB,,, | Performed by: INTERNAL MEDICINE

## 2023-07-12 PROCEDURE — 1126F AMNT PAIN NOTED NONE PRSNT: CPT | Mod: CPTII,S$GLB,, | Performed by: INTERNAL MEDICINE

## 2023-07-12 PROCEDURE — 3044F HG A1C LEVEL LT 7.0%: CPT | Mod: CPTII,S$GLB,, | Performed by: INTERNAL MEDICINE

## 2023-07-12 PROCEDURE — 1159F PR MEDICATION LIST DOCUMENTED IN MEDICAL RECORD: ICD-10-PCS | Mod: CPTII,S$GLB,, | Performed by: INTERNAL MEDICINE

## 2023-07-12 PROCEDURE — 3078F PR MOST RECENT DIASTOLIC BLOOD PRESSURE < 80 MM HG: ICD-10-PCS | Mod: CPTII,S$GLB,, | Performed by: INTERNAL MEDICINE

## 2023-07-12 PROCEDURE — 3008F BODY MASS INDEX DOCD: CPT | Mod: CPTII,S$GLB,, | Performed by: INTERNAL MEDICINE

## 2023-07-12 PROCEDURE — 1126F PR PAIN SEVERITY QUANTIFIED, NO PAIN PRESENT: ICD-10-PCS | Mod: CPTII,S$GLB,, | Performed by: INTERNAL MEDICINE

## 2023-07-12 PROCEDURE — 4010F ACE/ARB THERAPY RXD/TAKEN: CPT | Mod: CPTII,S$GLB,, | Performed by: INTERNAL MEDICINE

## 2023-07-12 PROCEDURE — 3288F PR FALLS RISK ASSESSMENT DOCUMENTED: ICD-10-PCS | Mod: CPTII,S$GLB,, | Performed by: INTERNAL MEDICINE

## 2023-07-12 PROCEDURE — 3044F PR MOST RECENT HEMOGLOBIN A1C LEVEL <7.0%: ICD-10-PCS | Mod: CPTII,S$GLB,, | Performed by: INTERNAL MEDICINE

## 2023-07-12 PROCEDURE — 3288F FALL RISK ASSESSMENT DOCD: CPT | Mod: CPTII,S$GLB,, | Performed by: INTERNAL MEDICINE

## 2023-07-12 PROCEDURE — 99213 OFFICE O/P EST LOW 20 MIN: CPT | Mod: S$GLB,,, | Performed by: INTERNAL MEDICINE

## 2023-07-12 PROCEDURE — 3078F DIAST BP <80 MM HG: CPT | Mod: CPTII,S$GLB,, | Performed by: INTERNAL MEDICINE

## 2023-07-12 PROCEDURE — 1160F PR REVIEW ALL MEDS BY PRESCRIBER/CLIN PHARMACIST DOCUMENTED: ICD-10-PCS | Mod: CPTII,S$GLB,, | Performed by: INTERNAL MEDICINE

## 2023-07-12 PROCEDURE — 4010F PR ACE/ARB THEARPY RXD/TAKEN: ICD-10-PCS | Mod: CPTII,S$GLB,, | Performed by: INTERNAL MEDICINE

## 2023-07-12 PROCEDURE — 3075F SYST BP GE 130 - 139MM HG: CPT | Mod: CPTII,S$GLB,, | Performed by: INTERNAL MEDICINE

## 2023-07-12 PROCEDURE — 1101F PR PT FALLS ASSESS DOC 0-1 FALLS W/OUT INJ PAST YR: ICD-10-PCS | Mod: CPTII,S$GLB,, | Performed by: INTERNAL MEDICINE

## 2023-07-12 PROCEDURE — 1101F PT FALLS ASSESS-DOCD LE1/YR: CPT | Mod: CPTII,S$GLB,, | Performed by: INTERNAL MEDICINE

## 2023-07-12 PROCEDURE — 1159F MED LIST DOCD IN RCRD: CPT | Mod: CPTII,S$GLB,, | Performed by: INTERNAL MEDICINE

## 2023-07-12 PROCEDURE — 3008F PR BODY MASS INDEX (BMI) DOCUMENTED: ICD-10-PCS | Mod: CPTII,S$GLB,, | Performed by: INTERNAL MEDICINE

## 2024-01-03 ENCOUNTER — TELEPHONE (OUTPATIENT)
Dept: HEMATOLOGY/ONCOLOGY | Facility: CLINIC | Age: 69
End: 2024-01-03

## 2024-01-03 NOTE — TELEPHONE ENCOUNTER
I spoke with pt and reminded him to have labs done prior to appt here on 1/10/24 pt states that he plans on going tomorrow to do them.

## 2024-01-09 NOTE — PROGRESS NOTES
[facility] Hematology/Oncology  PROGRESS NOTE -  Follow-up Visit      Subjective:       Patient ID:   NAME: Grisel Mckeon : 1955     68 y.o. male    Referring Doc: Ruchi  Other Physicians: Fab    Chief Complaint:  polycythemia f/u    History of Present Illness:     Patient returns today for a  regularly scheduled follow-up visit.  The patient is here today to go over the results of the recently ordered labs, tests and studies. He is here by himself.     He denies any new issues. No CP, SOB, HA's or N/V.  He had recent bout of flu and has some residual sinus drip and cough    He is still smoking.     He saw Dr Hopper last in 2023    Discussed covid19 precautions - he had his vaccinations            ROS:   GEN: normal without any fever, night sweats or weight loss  HEENT: normal with no HA's, sore throat, stiff neck, changes in vision  CV: normal with no CP, SOB, PND, HIGHTOWER or orthopnea  PULM: normal with no SOB, cough, hemoptysis, sputum or pleuritic pain  GI: normal with no abdominal pain, nausea, vomiting, constipation, diarrhea, melanotic stools, BRBPR, or hematemesis  : normal with no hematuria, dysuria  BREAST: normal with no mass, discharge, pain  SKIN: normal with no rash, erythema, bruising, or swelling;      Allergies:  Review of patient's allergies indicates:  No Known Allergies    Medications:    Current Outpatient Medications:     chlorthalidone (HYGROTEN) 25 MG Tab, TAKE 1 TABLET BY MOUTH EVERY DAY, Disp: 90 tablet, Rfl: 0    ergocalciferol (VITAMIN D2) 50,000 unit Cap, Take 1 capsule (50,000 Units total) by mouth every 30 days., Disp: 12 capsule, Rfl: 0    hydrALAZINE (APRESOLINE) 25 MG tablet, Take 1 tablet (25 mg total) by mouth every 8 (eight) hours., Disp: 270 tablet, Rfl: 0    lisinopriL 10 MG tablet, Take 1 tablet (10 mg total) by mouth once daily., Disp: 90 tablet, Rfl: 0    metoprolol succinate (TOPROL-XL) 25 MG 24 hr tablet, Take 1 tablet (25 mg total) by mouth once daily., Disp: 90  "tablet, Rfl: 0    NIFEdipine (ADALAT CC) 30 MG TbSR, TAKE 1 TABLET BY MOUTH ONCE DAILY, Disp: 90 tablet, Rfl: 0    PMHx/PSHx Updates:  See patient's last visit with me on 7/12/2023  See H&P on 2/22/2019        Pathology:  Cancer Staging  No matching staging information was found for the patient.          Objective:     Vitals:  Blood pressure 138/67, pulse 97, temperature 98.2 °F (36.8 °C), resp. rate 18, height 5' 8" (1.727 m), weight 65.2 kg (143 lb 12.8 oz).    Physical Examination:   GEN: no apparent distress, comfortable; AAOx3  HEAD: atraumatic and normocephalic  EYES: no pallor, no icterus, PERRLA  ENT: OMM, no pharyngeal erythema, external ears WNL; no nasal discharge; no thrush  NECK: no masses, thyroid normal, trachea midline, no LAD/LN's, supple  CV: RRR with no murmur; normal pulse; normal S1 and S2; no pedal edema  CHEST: Normal respiratory effort; CTAB; normal breath sounds; no wheeze or crackles  ABDOM: nontender and nondistended; soft; normal bowel sounds; no rebound/guarding  MUSC/Skeletal: ROM normal; no crepitus; joints normal; no deformities or arthropathy  EXTREM: no clubbing, cyanosis, inflammation or swelling  SKIN: no rashes, lesions, ulcers, petechiae or subcutaneous nodules;    : no carroll  NEURO: grossly intact; motor/sensory WNL; AAOx3; no tremors  PSYCH: normal mood, affect and behavior  LYMPH: normal cervical, supraclavicular, axillary and groin LN's            Labs:        Lab Results   Component Value Date    WBC 8.53 01/04/2024    HGB 17.6 01/04/2024    HCT 52.9 01/04/2024    MCV 94 01/04/2024     01/04/2024     CMP  Sodium   Date Value Ref Range Status   01/04/2024 140 136 - 145 mmol/L Final     Potassium   Date Value Ref Range Status   01/04/2024 3.7 3.5 - 5.1 mmol/L Final     Chloride   Date Value Ref Range Status   01/04/2024 103 95 - 110 mmol/L Final     CO2   Date Value Ref Range Status   01/04/2024 25 23 - 29 mmol/L Final     Glucose   Date Value Ref Range Status "   01/04/2024 110 70 - 110 mg/dL Final     BUN   Date Value Ref Range Status   01/04/2024 28 (H) 8 - 23 mg/dL Final     Creatinine   Date Value Ref Range Status   01/04/2024 1.3 0.5 - 1.4 mg/dL Final     Calcium   Date Value Ref Range Status   01/04/2024 9.6 8.7 - 10.5 mg/dL Final     Total Protein   Date Value Ref Range Status   01/04/2024 7.3 6.0 - 8.4 g/dL Final     Albumin   Date Value Ref Range Status   01/04/2024 4.1 3.5 - 5.2 g/dL Final     Total Bilirubin   Date Value Ref Range Status   01/04/2024 0.6 0.1 - 1.0 mg/dL Final     Comment:     For infants and newborns, interpretation of results should be based  on gestational age, weight and in agreement with clinical  observations.    Premature Infant recommended reference ranges:  Up to 24 hours.............<8.0 mg/dL  Up to 48 hours............<12.0 mg/dL  3-5 days..................<15.0 mg/dL  6-29 days.................<15.0 mg/dL       Alkaline Phosphatase   Date Value Ref Range Status   01/04/2024 51 (L) 55 - 135 U/L Final     AST   Date Value Ref Range Status   01/04/2024 19 10 - 40 U/L Final     ALT   Date Value Ref Range Status   01/04/2024 27 10 - 44 U/L Final     Anion Gap   Date Value Ref Range Status   01/04/2024 12 8 - 16 mmol/L Final     eGFR if    Date Value Ref Range Status   07/11/2022 >60.0 >60 mL/min/1.73 m^2 Final     eGFR if non    Date Value Ref Range Status   07/11/2022 >60.0 >60 mL/min/1.73 m^2 Final     Comment:     Calculation used to obtain the estimated glomerular filtration  rate (eGFR) is the CKD-EPI equation.           Lab Results   Component Value Date    IRON 81 01/04/2024    TRANSFERRIN 312 01/04/2024    TIBC 462 (H) 01/04/2024    FESATURATED 18 (L) 01/04/2024                Radiology/Diagnostic Studies:    Us Abdomen Limited    Result Date: 2/26/2019  EXAMINATION: US ABDOMEN LIMITED CLINICAL HISTORY: Tobacco use TECHNIQUE: Limited ultrasound of the right upper quadrant of the abdomen (including  pancreas, liver, gallbladder, common bile duct, and right kidney) was performed. COMPARISON: None. FINDINGS: Liver: Normal in size, measuring 15.1 cm. Homogeneous echotexture. There is a left lobe complex cyst measuring 1.3 x 1.1 x 1.4 cm.  There are 3 cysts seen in the right lobe with the largest measuring 9 mm. Gallbladder: There are multiple polyps present with the largest measuring 5 mm.  There is no evidence of gallbladder wall thickening.  The gallbladder contains sludge. Biliary system: The common duct is not dilated, measuring 3.4 mm.  No intrahepatic ductal dilatation. Right kidney: Normal in size with no hydronephrosis, measuring 11.2 x 4.8 x 6.3 cm.  There is a 5 mm cyst in the upper pole region.  There is dilatation of the lower pole calyx. Miscellaneous: The pancreas appears unremarkable.  The pancreatic duct measured 2.7 mm..     Benign-appearing liver cysts. Gallbladder contains sludge and luminal polyps. Normal biliary radicles.  Slight prominence of the pancreatic duct.    Impression       Benign-appearing liver cysts.    Gallbladder contains sludge and luminal polyps.    Normal biliary radicles.  Slight prominence of the pancreatic duct              Electronically signed by: Petrnoa Seaman MD Date:    02/26/2019 Time:    09:27      I have reviewed all available lab results and radiology reports.    Assessment/Plan:   (1) 68 y.o. male  with diagnosis of polycythemia who has been referred by Dr Hopper for evaluation by medical hematology. 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   - latest  hgb currently 16.1 and much better  - repeat CBC was WNL; LDH, Erythropoetin, retic and JAK2 were all WNL  - US on chart       1/14/2021:  - last labs from Nov 2020 with Hgb at 16.2  - he has not had any recent phlebotomies  - he is still smoking    7/15/2021:  - hgb adequate at 16.3  - he is still smoking    1/13/2022:  - latest hgb adequate at 16.1    7/13/2022:  -  hgb at 16.7 and adequate and stable    1/11/2013:  - latest hgb adequate at 17.3 and WNL still  - iron WNL and ferritin at 322    7/12/2023:  - latest Hgb WNL 17.1 - 16.3    1/10/2024:  - he has not done a phlebotomy recently  - latest hgb at 17.6 and it is at the up range of normal  - he plans to donate again in next couple months     (2) HTN and hypercholesterolemia     (3) Tobacco use     (4) CRI     (5) hx/of kidney stones s/p lithotripsy - followed by Dr Sanon with  in past        VISIT DIAGNOSES:      Polycythemia    Smoking addiction          PLAN:  1. Monitor basic labs every 3 months; continue to donate blood/phlebotomy as needed every 3-6 months (if need be)  2. Encouraged tobacco cessation  3. F/u with PCP about BP   4. RTC in 6 months  Fax note to Ruchi Hopper Abdolreza, MD    Discussion:     COVID-19 Discussion:    I had long discussion with patient and any applicable family about the COVID-19 coronavirus epidemic and the recommended precautions with regard to cancer and/or hematology patients. I have re-iterated the CDC recommendations for adequate hand washing, use of hand -like products, and coughing into elbow, etc. In addition, especially for our patients who are on chemotherapy and/or our otherwise immunocompromised patients, I have recommended avoidance of crowds, including movie theaters, restaurants, churches, etc. I have recommended avoidance of any sick or symptomatic family members and/or friends. I have also recommended avoidance of any raw and unwashed food products, and general avoidance of food items that have not been prepared by themselves. The patient has been asked to call us immediately with any symptom developments, issues, questions or other general concerns.       I spent over 25 mins of time with the patient. Reviewed results of the recently ordered labs, tests and studies; made directives with regards to the results. Over half of this time was spent couseling and  coordinating care.    I have explained all of the above in detail and the patient understands all of the current recommendation(s). I have answered all of their questions to the best of my ability and to their complete satisfaction.   The patient is to continue with the current management plan.            Electronically signed by Zaheer Landers MD                       Implemented All Universal Safety Interventions:  [place] to call system. Call bell, personal items and telephone within reach. Instruct patient to call for assistance. Room bathroom lighting operational. Non-slip footwear when patient is off stretcher. Physically safe environment: no spills, clutter or unnecessary equipment. Stretcher in lowest position, wheels locked, appropriate side rails in place.

## 2024-01-10 ENCOUNTER — OFFICE VISIT (OUTPATIENT)
Dept: HEMATOLOGY/ONCOLOGY | Facility: CLINIC | Age: 69
End: 2024-01-10
Payer: MEDICARE

## 2024-01-10 VITALS
WEIGHT: 143.81 LBS | HEART RATE: 97 BPM | SYSTOLIC BLOOD PRESSURE: 138 MMHG | BODY MASS INDEX: 21.79 KG/M2 | DIASTOLIC BLOOD PRESSURE: 67 MMHG | HEIGHT: 68 IN | TEMPERATURE: 98 F | RESPIRATION RATE: 18 BRPM

## 2024-01-10 DIAGNOSIS — D75.1 POLYCYTHEMIA: Primary | ICD-10-CM

## 2024-01-10 DIAGNOSIS — R71.8 OTHER ABNORMALITY OF RED BLOOD CELLS: ICD-10-CM

## 2024-01-10 DIAGNOSIS — F17.200 SMOKING ADDICTION: ICD-10-CM

## 2024-01-10 PROCEDURE — 99213 OFFICE O/P EST LOW 20 MIN: CPT | Mod: S$GLB,,, | Performed by: INTERNAL MEDICINE

## 2024-01-10 PROCEDURE — 1126F AMNT PAIN NOTED NONE PRSNT: CPT | Mod: CPTII,S$GLB,, | Performed by: INTERNAL MEDICINE

## 2024-01-10 PROCEDURE — 3078F DIAST BP <80 MM HG: CPT | Mod: CPTII,S$GLB,, | Performed by: INTERNAL MEDICINE

## 2024-01-10 PROCEDURE — 1159F MED LIST DOCD IN RCRD: CPT | Mod: CPTII,S$GLB,, | Performed by: INTERNAL MEDICINE

## 2024-01-10 PROCEDURE — 1160F RVW MEDS BY RX/DR IN RCRD: CPT | Mod: CPTII,S$GLB,, | Performed by: INTERNAL MEDICINE

## 2024-01-10 PROCEDURE — 3288F FALL RISK ASSESSMENT DOCD: CPT | Mod: CPTII,S$GLB,, | Performed by: INTERNAL MEDICINE

## 2024-01-10 PROCEDURE — 3075F SYST BP GE 130 - 139MM HG: CPT | Mod: CPTII,S$GLB,, | Performed by: INTERNAL MEDICINE

## 2024-01-10 PROCEDURE — 1101F PT FALLS ASSESS-DOCD LE1/YR: CPT | Mod: CPTII,S$GLB,, | Performed by: INTERNAL MEDICINE

## 2024-01-10 PROCEDURE — 3008F BODY MASS INDEX DOCD: CPT | Mod: CPTII,S$GLB,, | Performed by: INTERNAL MEDICINE

## 2024-01-26 ENCOUNTER — TELEPHONE (OUTPATIENT)
Dept: GASTROENTEROLOGY | Facility: CLINIC | Age: 69
End: 2024-01-26
Payer: MEDICARE

## 2024-01-26 NOTE — TELEPHONE ENCOUNTER
Called patient reference to a referral to  Ambulatory Colorectal Surgery for colon cancer screening.  Patient requested a call back on Monday, on his way out of town.

## 2024-02-14 ENCOUNTER — TELEPHONE (OUTPATIENT)
Dept: GASTROENTEROLOGY | Facility: CLINIC | Age: 69
End: 2024-02-14
Payer: MEDICARE

## 2024-02-14 NOTE — TELEPHONE ENCOUNTER
Called patient reference to a referral to  Ambulatory Colorectal Surgery for colon cancer screening,no answer, voicemail not set up.

## 2024-04-18 PROBLEM — Z80.1 FAMILY HX OF LUNG CANCER: Status: ACTIVE | Noted: 2024-04-18

## 2024-04-18 PROBLEM — R93.89 ABNORMAL CT OF THE CHEST: Status: ACTIVE | Noted: 2024-04-18

## 2024-07-09 ENCOUNTER — TELEPHONE (OUTPATIENT)
Dept: HEMATOLOGY/ONCOLOGY | Facility: CLINIC | Age: 69
End: 2024-07-09
Payer: MEDICARE

## 2024-07-09 NOTE — TELEPHONE ENCOUNTER
I spoke to patient to inform him that Dr. Landers's office no longer accepts his insurance.  [facility] accepts his insurance; however, patient stated he would contact his insurance provider and call me back with the name of the provider he chooses so that we can forward his records.  Patient voiced understanding and stated that he would call me back.

## 2024-07-31 ENCOUNTER — TELEPHONE (OUTPATIENT)
Dept: PAIN MEDICINE | Facility: CLINIC | Age: 69
End: 2024-07-31
Payer: MEDICARE

## 2024-07-31 NOTE — TELEPHONE ENCOUNTER
Spoke with patient. Discussed his ongoing issue with sciatic pain. Stated a flare up of the nerve began 2 weeks ago. Works professionally as a  for [employer] for his entire career. Confirmed his current coverage is [employer] insurance. Informed that we are not able to accept his coverage. Verbalized understanding. No further issues discussed.

## 2024-07-31 NOTE — TELEPHONE ENCOUNTER
----- Message from [place] sent at 7/29/2024  9:13 AM CDT -----  Regarding: Pt called states he needs to sche an appt for a Siatic nerve  Contact: [phone]  Name of Who is Calling:THIAGO CROFT [[id number]]        What is the request in detail:Pt called states he needs to sche an appt for a Siatic nerve. Please advise         Can the clinic reply by [facility]:No        What [facility]: Telephone Information:  Mobile          [phone]

## 2024-08-08 ENCOUNTER — TELEPHONE (OUTPATIENT)
Dept: PAIN MEDICINE | Facility: CLINIC | Age: 69
End: 2024-08-08
Payer: MEDICARE